# Patient Record
Sex: MALE | Race: BLACK OR AFRICAN AMERICAN | Employment: FULL TIME | URBAN - METROPOLITAN AREA
[De-identification: names, ages, dates, MRNs, and addresses within clinical notes are randomized per-mention and may not be internally consistent; named-entity substitution may affect disease eponyms.]

---

## 2020-10-30 ENCOUNTER — OFFICE VISIT (OUTPATIENT)
Dept: FAMILY MEDICINE CLINIC | Facility: CLINIC | Age: 45
End: 2020-10-30
Payer: COMMERCIAL

## 2020-10-30 VITALS
OXYGEN SATURATION: 98 % | BODY MASS INDEX: 35.52 KG/M2 | TEMPERATURE: 98 F | HEART RATE: 104 BPM | HEIGHT: 73 IN | WEIGHT: 268 LBS | RESPIRATION RATE: 14 BRPM

## 2020-10-30 DIAGNOSIS — Z20.822 SUSPECTED 2019 NOVEL CORONAVIRUS INFECTION: ICD-10-CM

## 2020-10-30 DIAGNOSIS — Z20.822 EXPOSURE TO 2019 NOVEL CORONAVIRUS: Primary | ICD-10-CM

## 2020-10-30 DIAGNOSIS — R05.9 COUGH: ICD-10-CM

## 2020-10-30 PROCEDURE — 99202 OFFICE O/P NEW SF 15 MIN: CPT | Performed by: NURSE PRACTITIONER

## 2020-10-30 PROCEDURE — 3008F BODY MASS INDEX DOCD: CPT | Performed by: NURSE PRACTITIONER

## 2020-10-30 RX ORDER — LISINOPRIL 10 MG/1
10 TABLET ORAL DAILY
COMMUNITY
Start: 2020-04-01

## 2020-10-30 NOTE — PATIENT INSTRUCTIONS
Coronavirus Disease 2019 (COVID-19)     [facility] is committed to the safety and well-being of our patients, members, employees, and communities.  As concerns arise about the new strain of coronavirus that causes COVID-19, [facility] 4. If you have a medical appointment, call the healthcare provider ahead of time and tell them that you have or may have COVID-19.  5. For medical emergencies, call 911 and notify the dispatch personnel that you have or may have COVID-19.   6. Cover your c · At least 10 days have passed since symptoms first appeared OR if asymptomatic patient or date of symptom onset is unclear then use 10 days post COVID test date.    · At least 20 days have passed for severe illness (requiring hospitalization) OR if you are *Some people will be required to have a repeat COVID-19 test in order to be eligible to donate. If you’re instructed by Roque Hernandez that a repeat test is required, please contact the [facility] COVID-19 Nurse Triage Line at [phone].     Additional Inf

## 2020-10-30 NOTE — PROGRESS NOTES
CHIEF COMPLAINT:   Patient presents with:  Covid: exposure      HPI:   Amy Martinez is a 39year old male who presents for possible Covid 19 exposure. Lives with brother in law who has covid symptoms and got tested today.   Reports mild intermittent coug -Quarantine 14 days from last significant exposure, doesn't change based on negative results     To f/u with PCP as needed, to ED if sx worsen.          Patient Instructions   Coronavirus Disease 2019 (COVID-19)     [facility] is committed to th 1. Stay home from work, school, and away from other public places. If you must go out, avoid using any kind of public transportation, ridesharing, or taxis. 2. Monitor your symptoms carefully.  If your symptoms get worse, call your healthcare provider imm If you have tested positive for COVID-19, you should remain under home isolation precautions following the below guidelines:  ? At least 24 hours have passed since recovery defined as resolution of fever without the use of fever-reducing medications; and If you would be interested in donating your plasma to help treat others diagnosed with the virus, please contact [facility] directly on their website: [email or web address]    Who is eligible to donate convalescent plasma?

## 2023-07-17 ENCOUNTER — HOSPITAL ENCOUNTER (EMERGENCY)
Facility: HOSPITAL | Age: 48
Discharge: HOME | End: 2023-07-17
Attending: EMERGENCY MEDICINE
Payer: MEDICAID

## 2023-07-17 VITALS
DIASTOLIC BLOOD PRESSURE: 82 MMHG | TEMPERATURE: 98 F | HEART RATE: 100 BPM | RESPIRATION RATE: 18 BRPM | SYSTOLIC BLOOD PRESSURE: 146 MMHG | OXYGEN SATURATION: 98 %

## 2023-07-17 DIAGNOSIS — S61.217A LACERATION OF LEFT LITTLE FINGER WITHOUT FOREIGN BODY WITHOUT DAMAGE TO NAIL, INITIAL ENCOUNTER: Primary | ICD-10-CM

## 2023-07-17 PROCEDURE — 99283 EMERGENCY DEPT VISIT LOW MDM: CPT

## 2023-07-17 PROCEDURE — 12001 RPR S/N/AX/GEN/TRNK 2.5CM/<: CPT

## 2023-07-27 ENCOUNTER — HOSPITAL ENCOUNTER (EMERGENCY)
Facility: HOSPITAL | Age: 48
Discharge: HOME | End: 2023-07-27
Payer: MEDICAID

## 2023-07-27 VITALS
WEIGHT: 270 LBS | DIASTOLIC BLOOD PRESSURE: 80 MMHG | BODY MASS INDEX: 35.78 KG/M2 | RESPIRATION RATE: 18 BRPM | HEART RATE: 82 BPM | SYSTOLIC BLOOD PRESSURE: 134 MMHG | OXYGEN SATURATION: 100 % | HEIGHT: 73 IN | TEMPERATURE: 98 F

## 2023-07-27 DIAGNOSIS — Z48.02 ENCOUNTER FOR REMOVAL OF SUTURES: Primary | ICD-10-CM

## 2023-07-27 NOTE — ED INITIAL ASSESSMENT (HPI)
Patient arrives ambulatory through triage with request for suture removal.  Sutures placed at [facility] on 7/17.

## 2024-01-16 ENCOUNTER — LAB ENCOUNTER (OUTPATIENT)
Dept: LAB | Age: 49
End: 2024-01-16
Attending: INTERNAL MEDICINE
Payer: COMMERCIAL

## 2024-01-16 DIAGNOSIS — Z00.00 ROUTINE GENERAL MEDICAL EXAMINATION AT A HEALTH CARE FACILITY: ICD-10-CM

## 2024-01-16 DIAGNOSIS — E66.09 EXOGENOUS OBESITY: ICD-10-CM

## 2024-01-16 DIAGNOSIS — E11.65 INADEQUATELY CONTROLLED DIABETES MELLITUS (HCC): Primary | ICD-10-CM

## 2024-01-16 DIAGNOSIS — Z13.228 SCREENING FOR PHENYLKETONURIA (PKU): ICD-10-CM

## 2024-01-16 LAB
BILIRUB UR QL STRIP.AUTO: NEGATIVE
CLARITY UR REFRACT.AUTO: CLEAR
COLOR UR AUTO: COLORLESS
CREAT UR-SCNC: 46.3 MG/DL
GLUCOSE UR STRIP.AUTO-MCNC: >1000 MG/DL
KETONES UR STRIP.AUTO-MCNC: 10 MG/DL
LEUKOCYTE ESTERASE UR QL STRIP.AUTO: NEGATIVE
MICROALBUMIN UR-MCNC: <0.5 MG/DL
NITRITE UR QL STRIP.AUTO: NEGATIVE
PH UR STRIP.AUTO: 5 [PH] (ref 5–8)
PROT UR STRIP.AUTO-MCNC: NEGATIVE MG/DL
RBC UR QL AUTO: NEGATIVE
SP GR UR STRIP.AUTO: 1.03 (ref 1–1.03)
UROBILINOGEN UR STRIP.AUTO-MCNC: NORMAL MG/DL

## 2024-01-16 PROCEDURE — 82043 UR ALBUMIN QUANTITATIVE: CPT

## 2024-01-16 PROCEDURE — 36415 COLL VENOUS BLD VENIPUNCTURE: CPT

## 2024-01-16 PROCEDURE — 84439 ASSAY OF FREE THYROXINE: CPT

## 2024-01-16 PROCEDURE — 83036 HEMOGLOBIN GLYCOSYLATED A1C: CPT

## 2024-01-16 PROCEDURE — 80053 COMPREHEN METABOLIC PANEL: CPT

## 2024-01-16 PROCEDURE — 81003 URINALYSIS AUTO W/O SCOPE: CPT

## 2024-01-16 PROCEDURE — 85025 COMPLETE CBC W/AUTO DIFF WBC: CPT

## 2024-01-16 PROCEDURE — 80061 LIPID PANEL: CPT

## 2024-01-16 PROCEDURE — 84443 ASSAY THYROID STIM HORMONE: CPT

## 2024-01-16 PROCEDURE — 82570 ASSAY OF URINE CREATININE: CPT

## 2024-01-17 DIAGNOSIS — E11.65 INADEQUATELY CONTROLLED DIABETES MELLITUS (HCC): Primary | ICD-10-CM

## 2024-01-17 DIAGNOSIS — Z00.00 ROUTINE GENERAL MEDICAL EXAMINATION AT A HEALTH CARE FACILITY: ICD-10-CM

## 2024-01-17 DIAGNOSIS — E66.09 EXOGENOUS OBESITY: ICD-10-CM

## 2024-01-17 LAB
FASTING PATIENT LIPID ANSWER: YES
FASTING STATUS PATIENT QL REPORTED: YES

## 2024-01-19 ENCOUNTER — LAB ENCOUNTER (OUTPATIENT)
Dept: LAB | Facility: HOSPITAL | Age: 49
End: 2024-01-19
Attending: INTERNAL MEDICINE
Payer: COMMERCIAL

## 2024-01-19 DIAGNOSIS — E11.65 INADEQUATELY CONTROLLED DIABETES MELLITUS (HCC): ICD-10-CM

## 2024-01-19 DIAGNOSIS — Z12.5 SCREENING PSA (PROSTATE SPECIFIC ANTIGEN): Primary | ICD-10-CM

## 2024-01-19 DIAGNOSIS — Z00.00 ROUTINE GENERAL MEDICAL EXAMINATION AT A HEALTH CARE FACILITY: ICD-10-CM

## 2024-01-19 DIAGNOSIS — E66.09 EXOGENOUS OBESITY: ICD-10-CM

## 2024-01-19 LAB
ALBUMIN SERPL-MCNC: 4.9 G/DL (ref 3.2–4.8)
ALBUMIN/GLOB SERPL: 1.5 {RATIO} (ref 1–2)
ALP LIVER SERPL-CCNC: 49 U/L
ALT SERPL-CCNC: 45 U/L
ANION GAP SERPL CALC-SCNC: 12 MMOL/L (ref 0–18)
AST SERPL-CCNC: 33 U/L (ref ?–34)
BASOPHILS # BLD AUTO: 0.06 X10(3) UL (ref 0–0.2)
BASOPHILS NFR BLD AUTO: 0.8 %
BILIRUB SERPL-MCNC: 1.4 MG/DL (ref 0.3–1.2)
BUN BLD-MCNC: 24 MG/DL (ref 9–23)
BUN/CREAT SERPL: 14.7 (ref 10–20)
CALCIUM BLD-MCNC: 10.3 MG/DL (ref 8.7–10.4)
CHLORIDE SERPL-SCNC: 99 MMOL/L (ref 98–112)
CHOLEST SERPL-MCNC: 150 MG/DL (ref ?–200)
CO2 SERPL-SCNC: 24 MMOL/L (ref 21–32)
COMPLEXED PSA SERPL-MCNC: 0.38 NG/ML (ref ?–4)
CREAT BLD-MCNC: 1.63 MG/DL
DEPRECATED RDW RBC AUTO: 40.7 FL (ref 35.1–46.3)
EGFRCR SERPLBLD CKD-EPI 2021: 52 ML/MIN/1.73M2 (ref 60–?)
EOSINOPHIL # BLD AUTO: 0.1 X10(3) UL (ref 0–0.7)
EOSINOPHIL NFR BLD AUTO: 1.3 %
ERYTHROCYTE [DISTWIDTH] IN BLOOD BY AUTOMATED COUNT: 12.2 % (ref 11–15)
EST. AVERAGE GLUCOSE BLD GHB EST-MCNC: 392 MG/DL (ref 68–126)
GLOBULIN PLAS-MCNC: 3.2 G/DL (ref 2.8–4.4)
GLUCOSE BLD-MCNC: 311 MG/DL (ref 70–99)
HBA1C MFR BLD: 15.3 % (ref ?–5.7)
HCT VFR BLD AUTO: 46.4 %
HDLC SERPL-MCNC: 35 MG/DL (ref 40–59)
HGB BLD-MCNC: 15.4 G/DL
IMM GRANULOCYTES # BLD AUTO: 0.03 X10(3) UL (ref 0–1)
IMM GRANULOCYTES NFR BLD: 0.4 %
LDLC SERPL CALC-MCNC: 84 MG/DL (ref ?–100)
LYMPHOCYTES # BLD AUTO: 3.11 X10(3) UL (ref 1–4)
LYMPHOCYTES NFR BLD AUTO: 39 %
MCH RBC QN AUTO: 30.1 PG (ref 26–34)
MCHC RBC AUTO-ENTMCNC: 33.2 G/DL (ref 31–37)
MCV RBC AUTO: 90.6 FL
MONOCYTES # BLD AUTO: 0.76 X10(3) UL (ref 0.1–1)
MONOCYTES NFR BLD AUTO: 9.5 %
NEUTROPHILS # BLD AUTO: 3.91 X10 (3) UL (ref 1.5–7.7)
NEUTROPHILS # BLD AUTO: 3.91 X10(3) UL (ref 1.5–7.7)
NEUTROPHILS NFR BLD AUTO: 49 %
NONHDLC SERPL-MCNC: 115 MG/DL (ref ?–130)
OSMOLALITY SERPL CALC.SUM OF ELEC: 296 MOSM/KG (ref 275–295)
PLATELET # BLD AUTO: 223 10(3)UL (ref 150–450)
POTASSIUM SERPL-SCNC: 4.6 MMOL/L (ref 3.5–5.1)
PROT SERPL-MCNC: 8.1 G/DL (ref 5.7–8.2)
RBC # BLD AUTO: 5.12 X10(6)UL
SODIUM SERPL-SCNC: 135 MMOL/L (ref 136–145)
T4 FREE SERPL-MCNC: 1.5 NG/DL (ref 0.8–1.7)
TRIGL SERPL-MCNC: 182 MG/DL (ref 30–149)
TSI SER-ACNC: 1.88 MIU/ML (ref 0.55–4.78)
VLDLC SERPL CALC-MCNC: 29 MG/DL (ref 0–30)
WBC # BLD AUTO: 8 X10(3) UL (ref 4–11)

## 2024-01-19 PROCEDURE — 36415 COLL VENOUS BLD VENIPUNCTURE: CPT

## 2024-01-19 PROCEDURE — 84443 ASSAY THYROID STIM HORMONE: CPT

## 2024-01-19 PROCEDURE — 80053 COMPREHEN METABOLIC PANEL: CPT

## 2024-01-19 PROCEDURE — 83036 HEMOGLOBIN GLYCOSYLATED A1C: CPT

## 2024-01-19 PROCEDURE — 80061 LIPID PANEL: CPT

## 2024-01-19 PROCEDURE — 84439 ASSAY OF FREE THYROXINE: CPT

## 2024-01-19 PROCEDURE — 85025 COMPLETE CBC W/AUTO DIFF WBC: CPT

## 2024-01-22 ENCOUNTER — LAB ENCOUNTER (OUTPATIENT)
Dept: LAB | Facility: HOSPITAL | Age: 49
End: 2024-01-22
Attending: INTERNAL MEDICINE
Payer: COMMERCIAL

## 2024-01-22 DIAGNOSIS — E11.65 INADEQUATELY CONTROLLED DIABETES MELLITUS (HCC): ICD-10-CM

## 2024-01-22 DIAGNOSIS — Z13.228 SCREENING FOR PHENYLKETONURIA (PKU): ICD-10-CM

## 2024-01-22 DIAGNOSIS — E66.09 EXOGENOUS OBESITY: ICD-10-CM

## 2024-01-22 DIAGNOSIS — Z00.00 ROUTINE GENERAL MEDICAL EXAMINATION AT A HEALTH CARE FACILITY: Primary | ICD-10-CM

## 2024-01-22 LAB
ALBUMIN SERPL-MCNC: 4.9 G/DL (ref 3.2–4.8)
ALBUMIN/GLOB SERPL: 1.7 {RATIO} (ref 1–2)
ALP LIVER SERPL-CCNC: 54 U/L
ALT SERPL-CCNC: 50 U/L
ANION GAP SERPL CALC-SCNC: 8 MMOL/L (ref 0–18)
AST SERPL-CCNC: 31 U/L (ref ?–34)
BILIRUB SERPL-MCNC: 0.9 MG/DL (ref 0.3–1.2)
BUN BLD-MCNC: 19 MG/DL (ref 9–23)
BUN/CREAT SERPL: 13.3 (ref 10–20)
CALCIUM BLD-MCNC: 10.1 MG/DL (ref 8.7–10.4)
CHLORIDE SERPL-SCNC: 100 MMOL/L (ref 98–112)
CO2 SERPL-SCNC: 27 MMOL/L (ref 21–32)
CREAT BLD-MCNC: 1.43 MG/DL
EGFRCR SERPLBLD CKD-EPI 2021: 60 ML/MIN/1.73M2 (ref 60–?)
FASTING STATUS PATIENT QL REPORTED: YES
GLOBULIN PLAS-MCNC: 2.9 G/DL (ref 2.8–4.4)
GLUCOSE BLD-MCNC: 313 MG/DL (ref 70–99)
OSMOLALITY SERPL CALC.SUM OF ELEC: 294 MOSM/KG (ref 275–295)
POTASSIUM SERPL-SCNC: 4.7 MMOL/L (ref 3.5–5.1)
PROT SERPL-MCNC: 7.8 G/DL (ref 5.7–8.2)
SODIUM SERPL-SCNC: 135 MMOL/L (ref 136–145)

## 2024-01-22 PROCEDURE — 36415 COLL VENOUS BLD VENIPUNCTURE: CPT

## 2024-01-22 PROCEDURE — 80053 COMPREHEN METABOLIC PANEL: CPT

## 2024-02-01 ENCOUNTER — OFFICE VISIT (OUTPATIENT)
Dept: INTERNAL MEDICINE CLINIC | Facility: CLINIC | Age: 49
End: 2024-02-01

## 2024-02-01 VITALS
SYSTOLIC BLOOD PRESSURE: 102 MMHG | HEART RATE: 74 BPM | BODY MASS INDEX: 33.4 KG/M2 | DIASTOLIC BLOOD PRESSURE: 60 MMHG | RESPIRATION RATE: 17 BRPM | TEMPERATURE: 98 F | OXYGEN SATURATION: 98 % | HEIGHT: 73 IN | WEIGHT: 252 LBS

## 2024-02-01 DIAGNOSIS — N18.30 TYPE 2 DIABETES MELLITUS WITH STAGE 3 CHRONIC KIDNEY DISEASE, WITHOUT LONG-TERM CURRENT USE OF INSULIN, UNSPECIFIED WHETHER STAGE 3A OR 3B CKD (HCC): Primary | ICD-10-CM

## 2024-02-01 DIAGNOSIS — E78.2 MIXED HYPERLIPIDEMIA: ICD-10-CM

## 2024-02-01 DIAGNOSIS — E11.22 TYPE 2 DIABETES MELLITUS WITH STAGE 3 CHRONIC KIDNEY DISEASE, WITHOUT LONG-TERM CURRENT USE OF INSULIN, UNSPECIFIED WHETHER STAGE 3A OR 3B CKD (HCC): Primary | ICD-10-CM

## 2024-02-01 DIAGNOSIS — N18.30 STAGE 3 CHRONIC KIDNEY DISEASE, UNSPECIFIED WHETHER STAGE 3A OR 3B CKD (HCC): ICD-10-CM

## 2024-02-01 DIAGNOSIS — I10 PRIMARY HYPERTENSION: ICD-10-CM

## 2024-02-01 DIAGNOSIS — I25.10 MILD CAD: ICD-10-CM

## 2024-02-01 PROCEDURE — 90686 IIV4 VACC NO PRSV 0.5 ML IM: CPT | Performed by: INTERNAL MEDICINE

## 2024-02-01 PROCEDURE — 90471 IMMUNIZATION ADMIN: CPT | Performed by: INTERNAL MEDICINE

## 2024-02-01 PROCEDURE — 99204 OFFICE O/P NEW MOD 45 MIN: CPT | Performed by: INTERNAL MEDICINE

## 2024-02-01 RX ORDER — METFORMIN HYDROCHLORIDE 500 MG/1
1000 TABLET, EXTENDED RELEASE ORAL
COMMUNITY
Start: 2023-11-01

## 2024-02-01 RX ORDER — HYDROCHLOROTHIAZIDE 25 MG/1
25 TABLET ORAL EVERY MORNING
COMMUNITY
Start: 2023-01-09

## 2024-02-01 RX ORDER — AMOXICILLIN 500 MG/1
500 CAPSULE ORAL 2 TIMES DAILY
COMMUNITY
Start: 2024-01-31

## 2024-02-01 RX ORDER — ATORVASTATIN CALCIUM 10 MG/1
10 TABLET, FILM COATED ORAL NIGHTLY
COMMUNITY
Start: 2023-10-02

## 2024-02-01 NOTE — PROGRESS NOTES
Subjective:     Patient ID: Pamela Bagley is a 48 year old male.    HPI    Patient comes in today first time to establish care was following at [facility] before he just had an annual physical done with another physician but he does not want to follow-up there again patient asking about his leasant lab results he says he was never really given the result only was told to take this medication.  Patient says he had ran out of Januvia recently was only taking metformin for his diabetes his A1c came back at 15.  Patient says he is trying to watch diet was started on Farxiga 5 mg also his sugars have improved but they are still in the 200s.  He does drink plenty of fluid no chest pain or shortness of breath recently had cardiac cath last year with mild coronary artery disease.  Renal function slightly off also patient says he does drink a lot of fluids every day he had colonoscopy last year    History/Other:   Review of Systems   Constitutional: Negative.    HENT: Negative.     Eyes: Negative.    Respiratory: Negative.     Cardiovascular: Negative.    Gastrointestinal: Negative.    Genitourinary: Negative.    Musculoskeletal: Negative.    Skin: Negative.    Neurological: Negative.    Psychiatric/Behavioral: Negative.       Current Outpatient Medications   Medication Sig Dispense Refill    hydroCHLOROthiazide 25 MG Oral Tab Take 1 tablet (25 mg total) by mouth every morning.      metFORMIN  MG Oral Tablet 24 Hr Take 2 tablets (1,000 mg total) by mouth daily with breakfast.      atorvastatin 10 MG Oral Tab Take 1 tablet (10 mg total) by mouth nightly.      amoxicillin 500 MG Oral Cap Take 1 capsule (500 mg total) by mouth 2 (two) times daily.      dapagliflozin 10 MG Oral Tab Take 1 tablet (10 mg total) by mouth daily. 30 tablet 2    lisinopril 10 MG Oral Tab Take 1 tablet (10 mg total) by mouth daily.       Allergies:No Known Allergies    Past Medical History:   Diagnosis Date    Diabetes (HCC)     Essential  hypertension       No past surgical history on file.   No family history on file.   Social History:   Social History     Socioeconomic History    Marital status:    Tobacco Use    Smoking status: Never    Smokeless tobacco: Never   Substance and Sexual Activity    Alcohol use: Not Currently    Drug use: Not Currently        Objective:   Physical Exam  Vitals and nursing note reviewed.   Constitutional:       Appearance: He is well-developed.   HENT:      Head: Normocephalic and atraumatic.      Right Ear: External ear normal.      Left Ear: External ear normal.      Nose: Nose normal.   Eyes:      Conjunctiva/sclera: Conjunctivae normal.      Pupils: Pupils are equal, round, and reactive to light.   Cardiovascular:      Rate and Rhythm: Normal rate and regular rhythm.      Heart sounds: Normal heart sounds.   Pulmonary:      Effort: Pulmonary effort is normal.      Breath sounds: Normal breath sounds.   Abdominal:      General: Bowel sounds are normal.      Palpations: Abdomen is soft.   Genitourinary:     Penis: Normal.       Prostate: Normal.      Rectum: Normal.   Musculoskeletal:         General: Normal range of motion.      Cervical back: Normal range of motion and neck supple.   Skin:     General: Skin is warm and dry.   Neurological:      Mental Status: He is alert and oriented to person, place, and time.      Deep Tendon Reflexes: Reflexes are normal and symmetric.         Assessment & Plan:   1. Type 2 diabetes mellitus with stage 3 chronic kidney disease, without long-term current use of insulin, unspecified whether stage 3a or 3b CKD (HCC) we will increase the Farxiga to 10 mg will refer to diabetic educator and endocrine   2. Primary hypertension well-controlled continue current treatment   3. Mixed hyperlipidemia continue current treatment   4. Mild CAD follow-up with cardiology medical management   5. Stage 3 chronic kidney disease, unspecified whether stage 3a or 3b CKD (HCC) follows with  nephrology will give referral       No orders of the defined types were placed in this encounter.      Meds This Visit:  Requested Prescriptions     Signed Prescriptions Disp Refills    dapagliflozin 10 MG Oral Tab 30 tablet 2     Sig: Take 1 tablet (10 mg total) by mouth daily.       Imaging & Referrals:  NEPHROLOGY - INTERNAL  OPHTHALMOLOGY - INTERNAL  ENDOCRINOLOGY - INTERNAL  DIABETIC EDUCATION - INTERNAL  CARDIO - INTERNAL

## 2024-02-02 ENCOUNTER — LAB ENCOUNTER (OUTPATIENT)
Dept: LAB | Facility: HOSPITAL | Age: 49
End: 2024-02-02
Attending: INTERNAL MEDICINE
Payer: COMMERCIAL

## 2024-02-02 ENCOUNTER — OFFICE VISIT (OUTPATIENT)
Dept: NEPHROLOGY | Facility: CLINIC | Age: 49
End: 2024-02-02

## 2024-02-02 ENCOUNTER — TELEPHONE (OUTPATIENT)
Dept: INTERNAL MEDICINE CLINIC | Facility: CLINIC | Age: 49
End: 2024-02-02

## 2024-02-02 VITALS
HEART RATE: 93 BPM | HEIGHT: 73 IN | SYSTOLIC BLOOD PRESSURE: 98 MMHG | WEIGHT: 251 LBS | DIASTOLIC BLOOD PRESSURE: 64 MMHG | BODY MASS INDEX: 33.27 KG/M2

## 2024-02-02 DIAGNOSIS — N18.31 STAGE 3A CHRONIC KIDNEY DISEASE (HCC): ICD-10-CM

## 2024-02-02 DIAGNOSIS — E11.22 TYPE 2 DIABETES MELLITUS WITH STAGE 3 CHRONIC KIDNEY DISEASE, WITHOUT LONG-TERM CURRENT USE OF INSULIN, UNSPECIFIED WHETHER STAGE 3A OR 3B CKD (HCC): Primary | ICD-10-CM

## 2024-02-02 DIAGNOSIS — N18.31 STAGE 3A CHRONIC KIDNEY DISEASE (HCC): Primary | ICD-10-CM

## 2024-02-02 DIAGNOSIS — N18.30 TYPE 2 DIABETES MELLITUS WITH STAGE 3 CHRONIC KIDNEY DISEASE, WITHOUT LONG-TERM CURRENT USE OF INSULIN, UNSPECIFIED WHETHER STAGE 3A OR 3B CKD (HCC): Primary | ICD-10-CM

## 2024-02-02 LAB
ALBUMIN SERPL-MCNC: 4.8 G/DL (ref 3.2–4.8)
ANION GAP SERPL CALC-SCNC: 9 MMOL/L (ref 0–18)
BASOPHILS # BLD AUTO: 0.06 X10(3) UL (ref 0–0.2)
BASOPHILS NFR BLD AUTO: 0.9 %
BILIRUB UR QL: NEGATIVE
BUN BLD-MCNC: 19 MG/DL (ref 9–23)
BUN/CREAT SERPL: 13.7 (ref 10–20)
C3 SERPL-MCNC: 148.7 MG/DL (ref 90–170)
C4 SERPL-MCNC: 30 MG/DL (ref 12–36)
CALCIUM BLD-MCNC: 9.6 MG/DL (ref 8.7–10.4)
CHLORIDE SERPL-SCNC: 99 MMOL/L (ref 98–112)
CLARITY UR: CLEAR
CO2 SERPL-SCNC: 26 MMOL/L (ref 21–32)
COLOR UR: COLORLESS
CREAT BLD-MCNC: 1.39 MG/DL
CREAT UR-SCNC: 82 MG/DL
CRP SERPL-MCNC: <0.4 MG/DL (ref ?–1)
DEPRECATED RDW RBC AUTO: 40 FL (ref 35.1–46.3)
EGFRCR SERPLBLD CKD-EPI 2021: 63 ML/MIN/1.73M2 (ref 60–?)
EOSINOPHIL # BLD AUTO: 0.13 X10(3) UL (ref 0–0.7)
EOSINOPHIL NFR BLD AUTO: 1.8 %
ERYTHROCYTE [DISTWIDTH] IN BLOOD BY AUTOMATED COUNT: 12.3 % (ref 11–15)
ERYTHROCYTE [SEDIMENTATION RATE] IN BLOOD: 11 MM/HR
GLUCOSE BLD-MCNC: 230 MG/DL (ref 70–99)
GLUCOSE UR-MCNC: >1000 MG/DL
HCT VFR BLD AUTO: 44.1 %
HGB BLD-MCNC: 15.1 G/DL
HGB UR QL STRIP.AUTO: NEGATIVE
IMM GRANULOCYTES # BLD AUTO: 0.02 X10(3) UL (ref 0–1)
IMM GRANULOCYTES NFR BLD: 0.3 %
LEUKOCYTE ESTERASE UR QL STRIP.AUTO: NEGATIVE
LYMPHOCYTES # BLD AUTO: 2.87 X10(3) UL (ref 1–4)
LYMPHOCYTES NFR BLD AUTO: 40.8 %
MCH RBC QN AUTO: 30.3 PG (ref 26–34)
MCHC RBC AUTO-ENTMCNC: 34.2 G/DL (ref 31–37)
MCV RBC AUTO: 88.6 FL
MICROALBUMIN UR-MCNC: <0.3 MG/DL
MONOCYTES # BLD AUTO: 0.69 X10(3) UL (ref 0.1–1)
MONOCYTES NFR BLD AUTO: 9.8 %
NEUTROPHILS # BLD AUTO: 3.27 X10 (3) UL (ref 1.5–7.7)
NEUTROPHILS # BLD AUTO: 3.27 X10(3) UL (ref 1.5–7.7)
NEUTROPHILS NFR BLD AUTO: 46.4 %
NITRITE UR QL STRIP.AUTO: NEGATIVE
OSMOLALITY SERPL CALC.SUM OF ELEC: 288 MOSM/KG (ref 275–295)
PH UR: 5 [PH] (ref 5–8)
PHOSPHATE SERPL-MCNC: 3.8 MG/DL (ref 2.4–5.1)
PLATELET # BLD AUTO: 239 10(3)UL (ref 150–450)
POTASSIUM SERPL-SCNC: 4.1 MMOL/L (ref 3.5–5.1)
PROT UR-MCNC: 6.2 MG/DL (ref ?–14)
PROT UR-MCNC: NEGATIVE MG/DL
RBC # BLD AUTO: 4.98 X10(6)UL
RHEUMATOID FACT SERPL-ACNC: <10 IU/ML (ref ?–14)
SODIUM SERPL-SCNC: 134 MMOL/L (ref 136–145)
SP GR UR STRIP: 1.03 (ref 1–1.03)
UROBILINOGEN UR STRIP-ACNC: NORMAL
WBC # BLD AUTO: 7 X10(3) UL (ref 4–11)

## 2024-02-02 PROCEDURE — 86334 IMMUNOFIX E-PHORESIS SERUM: CPT

## 2024-02-02 PROCEDURE — 82043 UR ALBUMIN QUANTITATIVE: CPT

## 2024-02-02 PROCEDURE — 85652 RBC SED RATE AUTOMATED: CPT

## 2024-02-02 PROCEDURE — 85025 COMPLETE CBC W/AUTO DIFF WBC: CPT

## 2024-02-02 PROCEDURE — 86431 RHEUMATOID FACTOR QUANT: CPT

## 2024-02-02 PROCEDURE — 81003 URINALYSIS AUTO W/O SCOPE: CPT

## 2024-02-02 PROCEDURE — 86140 C-REACTIVE PROTEIN: CPT

## 2024-02-02 PROCEDURE — 82784 ASSAY IGA/IGD/IGG/IGM EACH: CPT

## 2024-02-02 PROCEDURE — 99205 OFFICE O/P NEW HI 60 MIN: CPT | Performed by: INTERNAL MEDICINE

## 2024-02-02 PROCEDURE — 80069 RENAL FUNCTION PANEL: CPT

## 2024-02-02 PROCEDURE — 84156 ASSAY OF PROTEIN URINE: CPT

## 2024-02-02 PROCEDURE — 86038 ANTINUCLEAR ANTIBODIES: CPT

## 2024-02-02 PROCEDURE — 86335 IMMUNFIX E-PHORSIS/URINE/CSF: CPT

## 2024-02-02 PROCEDURE — 86160 COMPLEMENT ANTIGEN: CPT

## 2024-02-02 PROCEDURE — 36415 COLL VENOUS BLD VENIPUNCTURE: CPT

## 2024-02-02 PROCEDURE — 82570 ASSAY OF URINE CREATININE: CPT

## 2024-02-02 PROCEDURE — 84165 PROTEIN E-PHORESIS SERUM: CPT

## 2024-02-02 PROCEDURE — 84166 PROTEIN E-PHORESIS/URINE/CSF: CPT

## 2024-02-02 NOTE — PATIENT INSTRUCTIONS
Please do labs and kidney ultrasound as ordered.  We will contact you when results are in.  Keep working on getting your A1c under control.

## 2024-02-02 NOTE — PROGRESS NOTES
02/02/24        Patient: Pamela Bagley   YOB: 1975   Date of Visit: 2/2/2024       Dear  Dr. Nick MD,      Thank you for referring Pamela Bagley to my practice.  Please find my assessment and plan below.      As you know he is a 48-year-old male with a history of hypertension and adult onset diabetes mellitus who I now had the pleasure of seeing for evaluation of what may be chronic kidney disease stage III.  Laboratory studies have been reviewed in [facility] and in care everywhere.  Of note is he had a creatinine of 1.4 back in February 2023.  Was 1.20 in September 2023 but in October 2023 his creatinine crept up to 1.6.  Now more recently in January 19, 2000 2040 creatinine 1.63 with an estimated GFR of 52 cc/min.  On that date though his blood sugar was 311 with an A1c of 15.3.  Repeat labs done on January 22, 2024 shows creatinine was a bit better at 1.43.    His past medical history is significant for hypertension diagnosed in 2015.  States he was diagnosed with adult onset diabetes mellitus in 2017.  He was started on medications but subsequently was able to control his diabetes with diet alone.  Did well for 2 or 3 years but then his blood sugars increase and diabetic medications were resumed in 2021.  One of his medications was Januvia.  He had insurance issues and as result cannot afford that medication.  Now has new insurance starting at the beginning of the year and wants to get back on track.  Medications are as listed.  Denies any significant use of nonsteroidals.    Social history quit smoking cigarettes 13 years ago.  Family history is notable for father who apparently had liver failure and subsequent developed chronic kidney disease and recently underwent a liver kidney transplantation.  Doing well.  Family history is also positive for diabetes.  Review of system patient was states he is doing well without any chest pain, shortness of breath, or GI symptoms.  He has noted urinary  frequency with nocturia several times which now is improving as his blood sugars have improved.  10 point review of systems is otherwise unremarkable.    Physical exam initial blood pressure seems a bit low at 98/64 but repeat was 120/75.  Pulse was 93 and he weighed 251 pounds.  His neck was supple without JVD.  Lungs are clear.  Heart revealed a regular rate and rhythm and S4 but no gallops, murmurs or rubs.  Abdomen was soft, flat, nontender without organomegaly, masses or bruits.  Extremities revealed no clubbing, cyanosis or edema.    I therefore informed the patient that he may have chronic kidney disease III secondary to diabetes and/or hypertension.  Some of this may be acute secondary to diabetes out-of-control.  Hopefully will see some improvement in his renal function.  For completion sake a repeat CBC, renal panel, urinalysis, urine for microalbumin, urine for Bence-Astorga protein, sed rate, connective tissue profile and a renal ultrasound have been ordered.  Further impressions and recommendations will be forthcoming after reviewing the above.  He will check his blood pressures daily to confirm that his blood pressures are under adequate control.  Reinforced the importance of getting his A1c down below 7 in order to help prevent diabetic complications.  The patient states he understands and is getting back to exercise and watching his diet.    Thank you very much for allowing me to participate in the care of your patient.  If you have any questions please feel free to call.           Sincerely,   Donato Melendez MD   [facility], [facility], [place]  [address]  [facility] [place] [address]    Document electronically generated by:  Donato Melendez MD

## 2024-02-02 NOTE — TELEPHONE ENCOUNTER
Patient called, verified Name and . States he received Ophthalmology referral from PCP yesterday, however, upon scheduling an appointment, he was told that Dr. Peguero is a pediatric ophthalmologist. He was given an appointment with a different provider but appointment is not until August. He wants to be seen sooner than August. He is requesting for a new Ophthalmology referral.      Confirmed with Ophthalmology, earliest availability with Dr. Cao is on . States that they usually recommend patient to see [facility] for sooner availability, either Dr. Campos or Dr. Parkinson.    Dr. Aureliano Romero please see pended referral for review and approval if appropriate.

## 2024-02-05 ENCOUNTER — TELEPHONE (OUTPATIENT)
Dept: NEPHROLOGY | Facility: CLINIC | Age: 49
End: 2024-02-05

## 2024-02-05 ENCOUNTER — HOSPITAL ENCOUNTER (OUTPATIENT)
Dept: ULTRASOUND IMAGING | Facility: HOSPITAL | Age: 49
Discharge: HOME | End: 2024-02-05
Attending: INTERNAL MEDICINE
Payer: COMMERCIAL

## 2024-02-05 DIAGNOSIS — N18.31 STAGE 3A CHRONIC KIDNEY DISEASE (HCC): ICD-10-CM

## 2024-02-05 LAB — NUCLEAR IGG TITR SER IF: NEGATIVE {TITER}

## 2024-02-05 PROCEDURE — 76770 US EXAM ABDO BACK WALL COMP: CPT | Performed by: INTERNAL MEDICINE

## 2024-02-05 NOTE — TELEPHONE ENCOUNTER
Labs and kidney ultrasound are in.  Please see soon for follow-up to review.  Bring in any recent blood pressure readings

## 2024-02-06 ENCOUNTER — TELEPHONE (OUTPATIENT)
Dept: INTERNAL MEDICINE CLINIC | Facility: CLINIC | Age: 49
End: 2024-02-06

## 2024-02-06 DIAGNOSIS — E11.22 TYPE 2 DIABETES MELLITUS WITH STAGE 3 CHRONIC KIDNEY DISEASE, WITHOUT LONG-TERM CURRENT USE OF INSULIN, UNSPECIFIED WHETHER STAGE 3A OR 3B CKD (HCC): Primary | ICD-10-CM

## 2024-02-06 DIAGNOSIS — N18.30 TYPE 2 DIABETES MELLITUS WITH STAGE 3 CHRONIC KIDNEY DISEASE, WITHOUT LONG-TERM CURRENT USE OF INSULIN, UNSPECIFIED WHETHER STAGE 3A OR 3B CKD (HCC): Primary | ICD-10-CM

## 2024-02-06 RX ORDER — METFORMIN HYDROCHLORIDE 500 MG/1
1000 TABLET, EXTENDED RELEASE ORAL
Qty: 180 TABLET | Refills: 1 | Status: SHIPPED | OUTPATIENT
Start: 2024-02-06

## 2024-02-06 NOTE — TELEPHONE ENCOUNTER
Patient needs a refill; has only a few pills left.    metFORMIN  MG Oral Tablet 24 Hr -- -- 11/1/2023 --   Sig:   Take 2 tablets (1,000 mg total) by mouth daily with breakfast.       [id number] [address]. [phone], [phone]

## 2024-02-06 NOTE — TELEPHONE ENCOUNTER
Patient would like another referral for an ophthalmologist. Dr. Campos is not able to see the patient until August; please call.

## 2024-02-06 NOTE — TELEPHONE ENCOUNTER
Dr. Aureliano Romero:  patient needs refill.   This would be new RX from you.    Patient establish care with Dr Romero 2/1/24.       Requested Prescriptions     Pending Prescriptions Disp Refills    metFORMIN  MG Oral Tablet 24 Hr 180 tablet 1     Sig: Take 2 tablets (1,000 mg total) by mouth daily with breakfast.         Recent Visits  Date Type Provider Dept   02/01/24 Office Visit Aureliano Romero MD [id number]   Showing recent visits within past 540 days with a meds authorizing provider and meeting all other requirements  Future Appointments  No visits were found meeting these conditions.  Showing future appointments within next 150 days with a meds authorizing provider and meeting all other requirements

## 2024-02-07 LAB
ALBUMIN SERPL ELPH-MCNC: 4.77 G/DL (ref 3.75–5.21)
ALBUMIN/GLOB SERPL: 1.69 {RATIO} (ref 1–2)
ALPHA1 GLOB SERPL ELPH-MCNC: 0.24 G/DL (ref 0.19–0.46)
ALPHA2 GLOB SERPL ELPH-MCNC: 0.62 G/DL (ref 0.48–1.05)
B-GLOBULIN SERPL ELPH-MCNC: 0.82 G/DL (ref 0.68–1.23)
GAMMA GLOB SERPL ELPH-MCNC: 1.15 G/DL (ref 0.62–1.7)
IGA SERPL-MCNC: 207.4 MG/DL (ref 40–350)
IGM SERPL-MCNC: 67.7 MG/DL (ref 50–300)
IMMUNOGLOBULIN PNL SER-MCNC: 1235 MG/DL (ref 650–1600)
PROT SERPL-MCNC: 7.6 G/DL (ref 5.7–8.2)

## 2024-02-08 ENCOUNTER — TELEPHONE (OUTPATIENT)
Dept: NEPHROLOGY | Facility: CLINIC | Age: 49
End: 2024-02-08

## 2024-02-08 NOTE — TELEPHONE ENCOUNTER
Labs and kidney ultrasound are in.  please see soon for follow-up to review.  Bring in any recent blood pressure readings.

## 2024-02-15 ENCOUNTER — OFFICE VISIT (OUTPATIENT)
Facility: LOCATION | Age: 49
End: 2024-02-15

## 2024-02-15 VITALS
OXYGEN SATURATION: 98 % | HEART RATE: 89 BPM | WEIGHT: 252 LBS | BODY MASS INDEX: 33.4 KG/M2 | DIASTOLIC BLOOD PRESSURE: 72 MMHG | HEIGHT: 73 IN | SYSTOLIC BLOOD PRESSURE: 132 MMHG

## 2024-02-15 DIAGNOSIS — I10 HYPERTENSION, UNSPECIFIED TYPE: ICD-10-CM

## 2024-02-15 DIAGNOSIS — N18.9 CHRONIC KIDNEY DISEASE, UNSPECIFIED CKD STAGE: ICD-10-CM

## 2024-02-15 DIAGNOSIS — E11.65 UNCONTROLLED TYPE 2 DIABETES MELLITUS WITH HYPERGLYCEMIA (HCC): Primary | ICD-10-CM

## 2024-02-15 DIAGNOSIS — I10 PRIMARY HYPERTENSION: ICD-10-CM

## 2024-02-15 DIAGNOSIS — E11.65 HYPERGLYCEMIA DUE TO DIABETES MELLITUS (HCC): ICD-10-CM

## 2024-02-15 DIAGNOSIS — E66.9 CLASS 1 OBESITY WITH BODY MASS INDEX (BMI) OF 33.0 TO 33.9 IN ADULT, UNSPECIFIED OBESITY TYPE, UNSPECIFIED WHETHER SERIOUS COMORBIDITY PRESENT: ICD-10-CM

## 2024-02-15 DIAGNOSIS — E78.5 DYSLIPIDEMIA: ICD-10-CM

## 2024-02-15 DIAGNOSIS — R73.09 HIGH GLUCOSE LEVEL: ICD-10-CM

## 2024-02-15 LAB
GLUCOSE BLOOD: 328
TEST STRIP EXPIRATION DATE: NORMAL DATE
TEST STRIP LOT #: NORMAL NUMERIC

## 2024-02-15 PROCEDURE — 99205 OFFICE O/P NEW HI 60 MIN: CPT | Performed by: INTERNAL MEDICINE

## 2024-02-15 PROCEDURE — 82947 ASSAY GLUCOSE BLOOD QUANT: CPT | Performed by: INTERNAL MEDICINE

## 2024-02-15 RX ORDER — METFORMIN HYDROCHLORIDE 500 MG/1
1000 TABLET, EXTENDED RELEASE ORAL 2 TIMES DAILY WITH MEALS
Qty: 120 TABLET | Refills: 1 | Status: SHIPPED | OUTPATIENT
Start: 2024-02-15 | End: 2024-08-13

## 2024-02-15 RX ORDER — INSULIN GLARGINE-YFGN 100 [IU]/ML
30 INJECTION, SOLUTION SUBCUTANEOUS DAILY
Qty: 9 ML | Refills: 0 | Status: SHIPPED | OUTPATIENT
Start: 2024-02-15 | End: 2024-03-16

## 2024-02-15 RX ORDER — TIRZEPATIDE 2.5 MG/.5ML
2.5 INJECTION, SOLUTION SUBCUTANEOUS
Qty: 2 EACH | Refills: 3 | Status: SHIPPED | OUTPATIENT
Start: 2024-02-15

## 2024-02-15 RX ORDER — ACYCLOVIR 400 MG/1
1 TABLET ORAL
Qty: 3 EACH | Refills: 5 | Status: SHIPPED | OUTPATIENT
Start: 2024-02-15 | End: 2024-08-13

## 2024-02-15 RX ORDER — INSULIN LISPRO 200 [IU]/ML
10 INJECTION, SOLUTION SUBCUTANEOUS
Qty: 45 EACH | Refills: 0 | Status: SHIPPED | OUTPATIENT
Start: 2024-02-15 | End: 2024-05-15

## 2024-02-15 NOTE — PROGRESS NOTES
New Patient Evaluation - History and Physical    CONSULT - Reason for Visit:  uncontrolled DM.  Requesting Physician:   Opal Romero MD      CHIEF COMPLAINT:    Chief Complaint   Patient presents with    Consult     Diabetes last A1c 15.3        HISTORY OF PRESENT ILLNESS:   Pamela Bagley is a 48 year old male who presents with  uncontrolled DM, CKD, HTN, DLP, obese     t2DM was Dx in 2017   Now on   metformin 1000 mg daily - will increase to bid   Farxiga 10 started 2024    Was on januvia but stopped d/t cost    Was on insulin for short term when was dx  for ~ 9 mo    2/15/2024  - had chips this AM    Has blurry vision now       Lab Results   Component Value Date    A1C 15.3 (H) 2024      DM quality measures:  A1C/Blood pressure: as reported above   Nephropathy screenin2024  continue ace /arb rx.   LIPID screenin2024 .   statin rx.   Last dilated eye exam: No data recorded  10/2023  Exam shows retinopathy? No data recorded  Last diabetic foot exam: No data recorded  Dentist : recommend every 6m  Neuropathy:  no   CAD/ASCVD/PAD/CVA: no     GLP-1 agonists:  No personal or family history of MEN syndrome   No personal history pancreatitis   Patient counselled regarding side effects including injection site reactions, nausea, vomiting, diarrhea, pancreatitis, gastroparesis and rare side effect candis Julian syndrome.    SGLT2 inhibitors  No UTI or yeast infection   Discussed side effects including UTI and fungal infections.   Discussed the importance of hydration.      He is seeing nephrology     Snacks fruits , smoothies,   Dinner is largest meal   No sugary drinks     ASSESSMENT AND PLAN:    48 year old male who presents with  uncontrolled complicated DM, CKD, HTN, DLP, obese   He drinks smoothies and takes  high carb snacks   He will benefit from CDE consult, more teaching and close f/u   Reviewed his labs and explained the need to control BG.   Reviewed A1c and DM complication chart    GFR was 50s, not 60s.     Follow up with nephrology. Motivated today to control DM and HTN to avoid kidney damage      Recent A1c is very high     Target A1c 6.5%  Target BG   BEFORE MEAL 100-130mg/dl  2hrs AFTER MEAL less than 180mg/dl    plan  Will f/u in 2 weeks with [facility] and in 4 weeks with me   Will titrate meds as tolerated       Will give start CGM simone    follow up with [facility] in 2 weeks for CGM download    Will refer to CDE, podiatry, ophthalmology    Will send CGM  Rx - let us know if not covered or expensive     Metformin  increase to 1000 mg twice day. If getting diarrhea, wait and do not increase the dose till the diarrhea resolves.   Farxiga 10 mg daily   Atorvastatin 10 mg daily   Lisinopril 10 mg daily     Will add   Glargine insulin daily 30 units a day   Humalog 10 units with dinner , the largest meal of the day. If needed, will add to the other meals   Start mounjaro 2.5 mg weekly  - let us know if not covered     Walk daily 30 min/day   Stop sugary drinks   Limit simple cabs in diet - grapes are high in  sugar     Will hold on pioglitazone now     Check blood sugar fasting, before meals and before bedtime.   If you have low blood sugar <70, take 15 grams of carb (8 oz juice or regular soda) and recheck in 15 minutes.    Follow up with podiatry and eye doctor annually.   Patients need to wear covered shoes all the time and check feet daily.   Bring your meter/BG log to the next visit.          GLP-1 agonists:  No personal or family history of MEN syndrome   No personal history pancreatitis   Patient counselled regarding side effects including injection site reactions, nausea, vomiting, diarrhea, pancreatitis, gastroparesis and rare side effect candis Julian syndrome.    SGLT2 inhibitors  No UTI or yeast infection   Discussed side effects including UTI and fungal infections.   Discussed the importance of hydration.      We discussed these in detail with the patient and negotiated which of  numerous possible changes in the in the treatment plan that would be acceptable to them. The patient remains at ongoing is at high risk for complications related to uncontrolled diabetes and treatment.  The patient requires a great deal of self-management and support. We expect the patient's risk to be reduced with the changes to the treatment plan that we recommended today.   We reviewed a very large amount of complicated data including BG target range, basal insulin doses, meal and correction related insulin boluses, time of meal, size of meal, time of BG testing and insulin administration in relations to meals. We also reviewed self-testing BG results if available.         PAST MEDICAL HISTORY:   Past Medical History:   Diagnosis Date    Diabetes (HCC)     Essential hypertension        PAST SURGICAL HISTORY:   History reviewed. No pertinent surgical history.  Hernia     CURRENT MEDICATIONS:     Pediatric Multiple Vit-C-FA (MULTIPLE VITAMINS OR) Take by mouth daily.      metFORMIN  MG Oral Tablet 24 Hr Take 2 tablets (1,000 mg total) by mouth 2 (two) times daily with meals. 120 tablet 1    Insulin Glargine-yfgn (SEMGLEE, YFGN,) 100 UNIT/ML Subcutaneous Solution Inject 30 Units into the skin daily. 9 mL 0    Insulin Lispro (HUMALOG KWIKPEN) 200 UNIT/ML Subcutaneous Solution Pen-injector Inject 10 Units into the skin 3 (three) times daily before meals. 45 each 0    Continuous Blood Gluc Sensor (DEXCOM G7 SENSOR) Does not apply Misc 1 each Every 10 days. Use as directed every 10 days 3 each 5    Tirzepatide (MOUNJARO) 2.5 MG/0.5ML Subcutaneous Solution Pen-injector Inject 2.5 mg into the skin every 7 days. 2 each 3    hydroCHLOROthiazide 25 MG Oral Tab Take 1 tablet (25 mg total) by mouth every morning.      atorvastatin 10 MG Oral Tab Take 1 tablet (10 mg total) by mouth nightly.      dapagliflozin 10 MG Oral Tab Take 1 tablet (10 mg total) by mouth daily. 30 tablet 2    lisinopril 10 MG Oral Tab Take 1 tablet  (10 mg total) by mouth daily.           ALLERGIES:  No Known Allergies  None     SOCIAL HISTORY:    Social History     Socioeconomic History    Marital status:    Tobacco Use    Smoking status: Never    Smokeless tobacco: Never   Substance and Sexual Activity    Alcohol use: Not Currently    Drug use: Not Currently       FAMILY HISTORY:   History reviewed. No pertinent family history.   Both parents, brother, GF and aunt with DM    REVIEW OF SYSTEMS:  All negative other than HPI      PHYSICAL EXAM:   Height: 6' 1\" (185.4 cm) (02/15 1104)  Weight: 252 lb (114.3 kg) (02/15 1104)  BSA (Calculated - sq m): 2.37 sq meters (02/15 1104)  Pulse: 89 (02/15 1104)  BP: 132/72 (02/15 1104)  Temp: --  Do Not Use - Resp Rate: --  SpO2: 98 % (02/15 1104)    Body mass index is 33.25 kg/m².  Obese   No abd tenderness   No goiter   No tremors   CONSTITUTIONAL:  Awake and alert. Age appropriate, good hygiene not in acute distress. Well nourished and well developed. no acute distress   PSYCH:   Orientated to time, place, person & situation, Normal mood and affect, memory intact, normal insight and judgment, cooperative  Neuro: speech is clear. Awake, alert, no aphasia, no facial asymmetry, no nuchal rigidity  EYES:  No proptosis, no ptosis, conjunctiva normal  ENT:  Normocephalic, atraumatic  Eye: EOMI, normal lids, no discharge, no conjunctival erythema. No exophthalmos/proptosis, Ptosis negative   No rhinorrhea, moist oral mucosa  Neck: full range of motion  Neck/Thyroid: neck inspection: normal, No scar, No goiter   LUNGS:  No acute respiratory distress, non-labored respiration. Speaking full sentences  CARDIOVASCULAR:  regular rate   ABDOMEN:  No abdominal pain.   SKIN:  no bruising or bleeding, no rashes and no lesions, Skin is dry, no obvious rashes or lesions  EXTREMITIES: no gross abnormality   MSK: Moves extremities spontaneously. full range of motion in all major joints      DATA:     Pertinent data reviewed       Latest Reference Range & Units 24 12:15   HEMOGLOBIN A1c <5.7 % 15.3 (H)   (H): Data is abnormally high   Latest Reference Range & Units 24 15:59   CREATININE 0.70 - 1.30 mg/dL 1.39 (H)   CALCIUM 8.7 - 10.4 mg/dL 9.6   BUN/CREATININE RATIO 10.0 - 20.0  13.7   EGFR >=60 mL/min/1.73m2 63       Latest Reference Range & Units 24 12:15   Cholesterol, Total <200 mg/dL 150   Triglycerides 30 - 149 mg/dL 182 (H)   HDL Cholesterol 40 - 59 mg/dL 35 (L)   VLDL 0 - 30 mg/dL 29   NON-HDL CHOLESTEROL <130 mg/dL 115   LDL Cholesterol Calc <100 mg/dL 84      Latest Reference Range & Units 24 12:15   T4,Free (Direct) 0.8 - 1.7 ng/dL 1.5   TSH 0.550 - 4.780 mIU/mL 1.876      Latest Reference Range & Units 24 15:59   TOTAL PROTEIN URINE RANDOM <14.0 mg/dL 6.2   CREATININE UR RANDOM mg/dL 82.00   MALB URINE mg/dL <0.30   MALB/CRE CALC  See chart for results         No results for input(s): \"TSH\", \"T4F\", \"T3F\", \"THYP\" in the last 72 hours.  No results found.        Orders Placed This Encounter   Procedures    ASSAY QUANTITATIVE, GLUCOSE     Orders Placed This Encounter    ASSAY QUANTITATIVE, GLUCOSE     Order Specific Question:   Release to patient     Answer:   Immediate    Pediatric Multiple Vit-C-FA (MULTIPLE VITAMINS OR)     Sig: Take by mouth daily.    metFORMIN  MG Oral Tablet 24 Hr     Sig: Take 2 tablets (1,000 mg total) by mouth 2 (two) times daily with meals.     Dispense:  120 tablet     Refill:  1    Insulin Glargine-yfgn (SEMGLEE, YFGN,) 100 UNIT/ML Subcutaneous Solution     Sig: Inject 30 Units into the skin daily.     Dispense:  9 mL     Refill:  0    Insulin Lispro (HUMALOG KWIKPEN) 200 UNIT/ML Subcutaneous Solution Pen-injector     Sig: Inject 10 Units into the skin 3 (three) times daily before meals.     Dispense:  45 each     Refill:  0    Continuous Blood Gluc Sensor (DEXCOM G7 SENSOR) Does not apply Misc     Si each Every 10 days. Use as directed every 10 days     Dispense:  3  each     Refill:  5    Tirzepatide (MOUNJARO) 2.5 MG/0.5ML Subcutaneous Solution Pen-injector     Sig: Inject 2.5 mg into the skin every 7 days.     Dispense:  2 each     Refill:  3          This is a specialized patient consultation in endocrinology and required comprehensive review of prior records, as well as current evaluation, with time required for consideration of complex endocrine issues and consultation. For this visit, I personally interviewed the patient, and family member if accompanied, performed the pertinent parts of the history and physical examination. ROS included screening for appropriate endocrine conditions.   Today's diagnosis and plan were reviewed in detail with the patient who states understanding and agrees with plan. I discussed with the patient possible diagnosis, differential diagnosis, need for work up , treatment options, alternatives and side effects.     Please see note for details about time spent which includes:   · pre-visit preparation  · reviewing records  · face to face time with the patient   · timely documentation of the encounter  · ordering medications/tests  · communication with care team  · care coordination    I appreciate the opportunity to be part of your patient's medical care and will keep you, as the referring and primary physicians, informed about the care of your patient, including possible future surgery and pathology findings. Please feel free to contact me should you have any questions.      Tavares Gomez MD

## 2024-02-15 NOTE — PATIENT INSTRUCTIONS
Recent A1c is very high     Target A1c 6.5%  Target BG   BEFORE MEAL 100-130mg/dl  2hrs AFTER MEAL less than 180mg/dl    Will f/u in 2 weeks with [facility] and in 4 weeks with me   Will titrate meds as tolerated     Follow up with nephrology. Motivated today to control DM and HTN to avoid kidney damage    Will give start CGM simone    follow up with [facility] in 2 weeks for CGM download    Will refer to CDE, podiatry, ophthalmology    Will send CGM  Rx - let us know if not covered or expensive     Metformin  increase to 1000 mg twice day. If getting diarrhea, wait and do not increase the dose till the diarrhea resolves.   Farxiga 10 mg daily   Atorvastatin 10 mg daily   Lisinopril 10 mg daily     Will add   Glargine insulin daily 30 units a day   Humalog 10 units with dinner , the largest meal of the day. If needed, will add to the other meals   Start mounjaro 2.5 mg weekly  - let us know if not covered     Walk daily 30 min/day   Stop sugary drinks   Limit simple cabs in diet - grapes are high in  sugar     Will hold on pioglitazone now     Check blood sugar fasting, before meals and before bedtime.   If you have low blood sugar <70, take 15 grams of carb (8 oz juice or regular soda) and recheck in 15 minutes.    Follow up with podiatry and eye doctor annually.   Patients need to wear covered shoes all the time and check feet daily.   Bring your meter/BG log to the next visit.          GLP-1 agonists:  No personal or family history of MEN syndrome   No personal history pancreatitis   Patient counselled regarding side effects including injection site reactions, nausea, vomiting, diarrhea, pancreatitis, gastroparesis and rare side effect candis Julian syndrome.    SGLT2 inhibitors  No UTI or yeast infection   Discussed side effects including UTI and fungal infections.   Discussed the importance of hydration.      We discussed these in detail with the patient and negotiated which of numerous possible changes in the in the  treatment plan that would be acceptable to them. The patient remains at ongoing is at high risk for complications related to uncontrolled diabetes and treatment.  The patient requires a great deal of self-management and support. We expect the patient's risk to be reduced with the changes to the treatment plan that we recommended today.   We reviewed a very large amount of complicated data including BG target range, basal insulin doses, meal and correction related insulin boluses, time of meal, size of meal, time of BG testing and insulin administration in relations to meals. We also reviewed self-testing BG results if available.

## 2024-02-20 ENCOUNTER — TELEPHONE (OUTPATIENT)
Dept: ENDOCRINOLOGY CLINIC | Facility: CLINIC | Age: 49
End: 2024-02-20

## 2024-02-20 DIAGNOSIS — E11.65 UNCONTROLLED TYPE 2 DIABETES MELLITUS WITH HYPERGLYCEMIA (HCC): Primary | ICD-10-CM

## 2024-02-21 ENCOUNTER — OFFICE VISIT (OUTPATIENT)
Dept: NEPHROLOGY | Facility: CLINIC | Age: 49
End: 2024-02-21

## 2024-02-21 VITALS
WEIGHT: 259 LBS | HEART RATE: 97 BPM | SYSTOLIC BLOOD PRESSURE: 128 MMHG | DIASTOLIC BLOOD PRESSURE: 76 MMHG | BODY MASS INDEX: 34.33 KG/M2 | HEIGHT: 73 IN

## 2024-02-21 DIAGNOSIS — N18.2 CKD (CHRONIC KIDNEY DISEASE) STAGE 2, GFR 60-89 ML/MIN: Primary | ICD-10-CM

## 2024-02-21 PROCEDURE — 99213 OFFICE O/P EST LOW 20 MIN: CPT | Performed by: INTERNAL MEDICINE

## 2024-02-21 NOTE — TELEPHONE ENCOUNTER
Dr. Gomez,  Per patient: dexcom G7 needs PA - PA completed via [employer]  Mounjaro 2.5mg on b/o - please advise   Per pharmacist: lantus perferred (semglee on b/o) - RX for lantus pended for approval  Thanks

## 2024-02-21 NOTE — PROGRESS NOTES
02/21/24        Patient: Pamela Bagley   YOB: 1975   Date of Visit: 2/21/2024       Dear  Dr. Nick MD,      Thank you for referring Pamela Bagley to my practice.  Please find my assessment and plan below.      As you know he is a 48-year-old male with a history of hypertension and adult onset diabetes mellitus who I now had the pleasure of seeing for what may now be chronic kidney disease stage II.  Patient just underwent a recent renal evaluation.  Of note is that a sed rate, connective tissue profile, and random urine for Bence-Astorga protein was nonrevealing.  Urinalysis was normal as was his urine microalbumin to creatinine ratio.  Renal ultrasound showed a simple cyst in the right kidney but otherwise was unremarkable.  The liver was compatible with fatty infiltration.  Finally his creatinine done in February 2, 2024 was better down to 1.39 now with an estimated GFR of 63 cc/min.    I therefore informed the patient that his renal function has improved.  Now with chronic kidney disease stage II.  The patient's blood sugars had been out of control with an A1c of 15.3.  This was causing polyuria as well as nocturia leading to some component of volume depletion.  He states he has been working now with endocrine, is trying to watch his diet better and working out at the gym.  With this there has been improvement in his blood sugars but also his renal function.    Encouraged him to continue to work with endocrine to get his A1c is down below 7.  Otherwise he knows to maintain adequate hydration.  Avoid nonsteroidals.  Blood pressures are under good control.  Will have him repeat a CBC and renal panel in 3 months to ensure stability.  Will see again in 6 to 12 months depending upon clinical course.    Thank you again for allowing me to participate in the care of your patient.  If you have any questions please feel free to call.           Sincerely,   Donato Melendez MD   [facility]  [facility], [facility]  [address]  [place] [id number]    Document electronically generated by:  Donato Melendez MD

## 2024-02-21 NOTE — PATIENT INSTRUCTIONS
Continue to work with endocrine to get your blood sugars under better control.  Repeat your kidney blood test in 3 months.  Orders are in the computer.

## 2024-02-22 RX ORDER — INSULIN GLARGINE 100 [IU]/ML
30 INJECTION, SOLUTION SUBCUTANEOUS NIGHTLY
Qty: 9 ML | Refills: 0 | Status: SHIPPED | OUTPATIENT
Start: 2024-02-22

## 2024-02-27 ENCOUNTER — OFFICE VISIT (OUTPATIENT)
Facility: LOCATION | Age: 49
End: 2024-02-27

## 2024-02-27 ENCOUNTER — TELEPHONE (OUTPATIENT)
Dept: ENDOCRINOLOGY CLINIC | Facility: CLINIC | Age: 49
End: 2024-02-27

## 2024-02-27 DIAGNOSIS — M19.071 ARTHRITIS OF RIGHT MIDFOOT: ICD-10-CM

## 2024-02-27 DIAGNOSIS — B35.1 ONYCHOMYCOSIS: ICD-10-CM

## 2024-02-27 DIAGNOSIS — E11.9 TYPE 2 DIABETES MELLITUS WITHOUT COMPLICATION, UNSPECIFIED WHETHER LONG TERM INSULIN USE (HCC): Primary | ICD-10-CM

## 2024-02-27 DIAGNOSIS — M89.8X7 EXOSTOSIS OF RIGHT FOOT: ICD-10-CM

## 2024-02-27 PROCEDURE — 99203 OFFICE O/P NEW LOW 30 MIN: CPT | Performed by: PODIATRIST

## 2024-02-27 RX ORDER — CICLOPIROX 80 MG/ML
SOLUTION TOPICAL
Qty: 19.8 EACH | Refills: 2 | Status: SHIPPED | OUTPATIENT
Start: 2024-02-27

## 2024-02-27 NOTE — PROGRESS NOTES
[facility]  Progress Note    Pamela Bagley is a 48 year old male.   Chief Complaint   Patient presents with    Diabetic Foot Care     Consult - DM foot check and nail care - last A1C 15.3 on 1/19/24 - did not check BG this am, was 123 last night - only complaint as far as feet is that the top of right foot gets sore intermittently - 5-6/10 pain, happens a couple times a week - denies numbness, tingling,swelling         HPI:     Patient is a pleasant 48-year-old diabetic male who is presenting to clinic today for diabetic foot evaluation.  Patient states he is doing well overall, but does relate having intermittent pain to the top of his right midfoot.  He states that the pain usually occurs after long days on his feet when he is resting at home.  Rates the pain anywhere from 5-6/10.  He states that it does happen a couple times a week.  Denies any injuries.  Patient also has concerns in regards to his discolored right great toenail.  He has tried over-the-counter regimens, but is still experiencing some discoloration to the nail itself.  Hoping to discuss options today.  Denies any numbness, burning, or tingling to his feet.  No other concerns and here for further evaluation and care.  Denies recent nausea, vomiting, fever, chills.  Patient's most recent hemoglobin A1c was 15.3% on 1/19/2024      Allergies: Patient has no known allergies.   Current Outpatient Medications   Medication Sig Dispense Refill    Ciclopirox 8 % External Solution Apply 1-2 drops nightly to affected toenails 19.8 each 2    insulin glargine 100 UNIT/ML Subcutaneous Solution Inject 30 Units into the skin nightly. 9 mL 0    Pediatric Multiple Vit-C-FA (MULTIPLE VITAMINS OR) Take by mouth daily.      metFORMIN  MG Oral Tablet 24 Hr Take 2 tablets (1,000 mg total) by mouth 2 (two) times daily with meals. 120 tablet 1    Insulin Glargine-yfgn (SEMGLEE, YFGN,) 100 UNIT/ML Subcutaneous Solution Inject 30 Units into the skin  daily. 9 mL 0    Insulin Lispro (HUMALOG KWIKPEN) 200 UNIT/ML Subcutaneous Solution Pen-injector Inject 10 Units into the skin 3 (three) times daily before meals. 45 each 0    Continuous Blood Gluc Sensor (DEXCOM G7 SENSOR) Does not apply Misc 1 each Every 10 days. Use as directed every 10 days 3 each 5    hydroCHLOROthiazide 25 MG Oral Tab Take 1 tablet (25 mg total) by mouth every morning.      atorvastatin 10 MG Oral Tab Take 1 tablet (10 mg total) by mouth nightly.      dapagliflozin 10 MG Oral Tab Take 1 tablet (10 mg total) by mouth daily. 30 tablet 2    lisinopril 10 MG Oral Tab Take 1 tablet (10 mg total) by mouth daily.      Tirzepatide (MOUNJARO) 2.5 MG/0.5ML Subcutaneous Solution Pen-injector Inject 2.5 mg into the skin every 7 days. (Patient not taking: Reported on 2/27/2024) 2 each 3    amoxicillin 500 MG Oral Cap Take 1 capsule (500 mg total) by mouth 2 (two) times daily. (Patient not taking: Reported on 2/27/2024)        Past Medical History:   Diagnosis Date    Diabetes (HCC)     Essential hypertension       No past surgical history on file.   No family history on file.   Social History     Socioeconomic History    Marital status:    Tobacco Use    Smoking status: Never    Smokeless tobacco: Never   Substance and Sexual Activity    Alcohol use: Not Currently    Drug use: Not Currently           REVIEW OF SYSTEMS:     10 point ROS completed and was negative, except for pertinent positive and negatives stated in subjective.       EXAM:     GENERAL: well developed, well nourished, in no apparent distress  EXTREMITIES:  1. Integument: Bilateral hallux toenails are thickened, dystrophic, discolored with subungual debris.  There does appear to be proximal clearing noted.  Left fifth digit toenail is also thickened and discolored.  Remaining toenails x 7 are of normal morphology and adequate length.  Skin appears moist, warm, and supple with positive hair growth. There are no color changes. No open  lesions. No macerations. No Hyperkeratotic lesions.   2. Vascular: Dorsalis pedis 2/4 bilateral and posterior tibial pulses 2/4 bilateral, capillary refill normal.  3. Neurological: Gross sensation intact via light touch bilaterally.  Protective sensation intact via Tivoli test to bilateral feet  4. Musculoskeletal: All muscle groups are graded 5/5 in the foot and ankle.  No acute deformities noted.  Palpable exostosis to dorsal right midfoot with some pain elicited with palpation.      ASSESSMENT AND PLAN:   Diagnoses and all orders for this visit:    Type 2 diabetes mellitus without complication, unspecified whether long term insulin use (HCC)    Onychomycosis  -     Ciclopirox 8 % External Solution; Apply 1-2 drops nightly to affected toenails    Exostosis of right foot    Arthritis of right midfoot        Plan:   -Patient was seen and evaluated today in clinic.    -Discussed importance of proper pedal hygiene, daily foot checks, and tight glycemic control.    -Patient to avoid walking barefoot. Recommend ambulating with supportive diabetic shoes and inserts.    -Patient to monitor for acute signs of infection and seek immediate medical attention if any signs of infection or other concerns arise.    -Reviewed treatment options for nail dystrophy / onychomycosis including:   -No treatment and monitoring, nail debridement, topical meds, oral meds, and nail avulsion.  -Advantages and disadvantages of each reviewed. Using oral agents would require regular blood test monitoring due to risk of hepatotoxicity and other adverse reactions including drug interactions.   -Topical meds are much safer yet carry very low efficacy.  -Patient can also try home remedies such as soaking their feet in warm water and apple cider vinegar for 10 minutes a day, as well as utilizing Vicks vapor rub to the top of the nails daily.  -Pt has opted for ciclopirox  -Rx: Ciclopirox today    Patient's right midfoot pain does appear to be  arthritic in nature with palpable exostosis.  I did recommend trying over-the-counter Voltaren gel.  Also explained that we can look into injections in the future if needed.  Will obtain baseline right foot radiographs at next visit if patient has not improved.    -The patient indicates understanding of these issues and agrees to the plan.    Time spent reviewing pertinent information from patient's chart, reviewing any pertinent imaging, obtaining history and physical exam, discussing and mutually agreeing on a treatment plan, and documenting encounter: 35 minutes    RTC 3 months      Dominguez Diallo DPM        2/27/2024    Dragon speech recognition software was used to prepare this note.  Errors in word recognition may occur.  Please contact me with any questions/concerns with this note.

## 2024-03-05 ENCOUNTER — NURSE TRIAGE (OUTPATIENT)
Dept: INTERNAL MEDICINE CLINIC | Facility: CLINIC | Age: 49
End: 2024-03-05

## 2024-03-05 NOTE — TELEPHONE ENCOUNTER
Action Requested: Summary for Provider     []  Critical Lab, Recommendations Needed  [] Need Additional Advice  []   FYI    []   Need Orders  [] Need Medications Sent to Pharmacy  []  Other     SUMMARY: Patient called to schedule appointment with Dr Romero.  Reports for past month he gets sensation of \"tightness\" in his stomach.  Denies katja chest pain, numbness or tingling in arm, neck, shoulder or jaw, but feels pressure in his abdomen that comes and goes.  Also reports  history of hemorrhoids and has seen small amount of blood in his stool.  Has had some constipation and some looser stool.  He had EGD and colonoscopy at [place] 4/04/2023 for post-prandial chest pain and epigastric tightness despite omeprazole   Notes state both exams were normal.    Plan:  Scheduled appointment tomorrow in [place] office.  Patient was instructed to go to ER if he develops chest pain.  Advised trial of OTC Preparation H for hemorrhoids.    Future Appointments   Date Time Provider Department Center   3/6/2024 11:00 AM [facility]   3/14/2024 11:00 AM Tavares Gomez MD [id number]   5/28/2024 10:15 AM Truman Diallo DPM [facility]  [facility]   7/3/2024  3:00 PM Bandar Cao MD [facility]       Reason for call: Acute  Sensation of tightness in stomach, also hemorrhoids with blood in stool  Onset: over the past month    Reason for Disposition   Patient wants to be seen    Protocols used: Abdominal Pain - Upper-A-[place]

## 2024-03-06 ENCOUNTER — OFFICE VISIT (OUTPATIENT)
Dept: INTERNAL MEDICINE CLINIC | Facility: CLINIC | Age: 49
End: 2024-03-06

## 2024-03-06 ENCOUNTER — LAB ENCOUNTER (OUTPATIENT)
Dept: LAB | Age: 49
End: 2024-03-06
Attending: INTERNAL MEDICINE
Payer: COMMERCIAL

## 2024-03-06 VITALS
WEIGHT: 255 LBS | RESPIRATION RATE: 17 BRPM | DIASTOLIC BLOOD PRESSURE: 83 MMHG | HEART RATE: 77 BPM | BODY MASS INDEX: 33.8 KG/M2 | SYSTOLIC BLOOD PRESSURE: 124 MMHG | HEIGHT: 73 IN | OXYGEN SATURATION: 99 % | TEMPERATURE: 98 F

## 2024-03-06 DIAGNOSIS — R10.13 EPIGASTRIC PAIN: ICD-10-CM

## 2024-03-06 DIAGNOSIS — K64.4 EXTERNAL HEMORRHOID, BLEEDING: ICD-10-CM

## 2024-03-06 DIAGNOSIS — K59.00 CONSTIPATION, UNSPECIFIED CONSTIPATION TYPE: ICD-10-CM

## 2024-03-06 DIAGNOSIS — K64.4 EXTERNAL HEMORRHOID, BLEEDING: Primary | ICD-10-CM

## 2024-03-06 LAB
BASOPHILS # BLD AUTO: 0.03 X10(3) UL (ref 0–0.2)
BASOPHILS NFR BLD AUTO: 0.6 %
DEPRECATED RDW RBC AUTO: 45.6 FL (ref 35.1–46.3)
EOSINOPHIL # BLD AUTO: 0.21 X10(3) UL (ref 0–0.7)
EOSINOPHIL NFR BLD AUTO: 4.5 %
ERYTHROCYTE [DISTWIDTH] IN BLOOD BY AUTOMATED COUNT: 13 % (ref 11–15)
HCT VFR BLD AUTO: 45.4 %
HGB BLD-MCNC: 15.2 G/DL
IMM GRANULOCYTES # BLD AUTO: 0.01 X10(3) UL (ref 0–1)
IMM GRANULOCYTES NFR BLD: 0.2 %
LYMPHOCYTES # BLD AUTO: 1.52 X10(3) UL (ref 1–4)
LYMPHOCYTES NFR BLD AUTO: 32.7 %
MCH RBC QN AUTO: 31.7 PG (ref 26–34)
MCHC RBC AUTO-ENTMCNC: 33.5 G/DL (ref 31–37)
MCV RBC AUTO: 94.6 FL
MONOCYTES # BLD AUTO: 0.61 X10(3) UL (ref 0.1–1)
MONOCYTES NFR BLD AUTO: 13.1 %
NEUTROPHILS # BLD AUTO: 2.27 X10 (3) UL (ref 1.5–7.7)
NEUTROPHILS # BLD AUTO: 2.27 X10(3) UL (ref 1.5–7.7)
NEUTROPHILS NFR BLD AUTO: 48.9 %
PLATELET # BLD AUTO: 223 10(3)UL (ref 150–450)
RBC # BLD AUTO: 4.8 X10(6)UL
WBC # BLD AUTO: 4.7 X10(3) UL (ref 4–11)

## 2024-03-06 PROCEDURE — 99214 OFFICE O/P EST MOD 30 MIN: CPT | Performed by: INTERNAL MEDICINE

## 2024-03-06 PROCEDURE — 36415 COLL VENOUS BLD VENIPUNCTURE: CPT

## 2024-03-06 PROCEDURE — 85025 COMPLETE CBC W/AUTO DIFF WBC: CPT

## 2024-03-06 RX ORDER — PANTOPRAZOLE SODIUM 40 MG/1
40 TABLET, DELAYED RELEASE ORAL
Qty: 30 TABLET | Refills: 0 | Status: SHIPPED | OUTPATIENT
Start: 2024-03-06

## 2024-03-06 NOTE — PROGRESS NOTES
Subjective:   Patient ID: Nicolle Bagley is a 48 year old male.    HPI  Patient comes in today with complaint of having some blood in his stool with being constipated he does have history of hemorrhoids last time he moved his bowels was yesterday saw some blood concerned about that also has been having some abdominal bloating sometimes worse with food sometimes this is an ongoing problem patient has had colonoscopy and EGD done last year at [place] he has diabetes last A1c was very high but he says his blood sugars lately has been much better he is taking his meds  History/Other:   Review of Systems   Constitutional: Negative.    HENT: Negative.     Eyes: Negative.    Respiratory: Negative.     Cardiovascular: Negative.    Gastrointestinal:  Positive for abdominal distention, abdominal pain and blood in stool.   Genitourinary: Negative.    Musculoskeletal: Negative.    Skin: Negative.    Neurological: Negative.    Psychiatric/Behavioral: Negative.       Current Outpatient Medications   Medication Sig Dispense Refill    Ciclopirox 8 % External Solution Apply 1-2 drops nightly to affected toenails 19.8 each 2    insulin glargine 100 UNIT/ML Subcutaneous Solution Inject 30 Units into the skin nightly. 9 mL 0    Pediatric Multiple Vit-C-FA (MULTIPLE VITAMINS OR) Take by mouth daily.      metFORMIN  MG Oral Tablet 24 Hr Take 2 tablets (1,000 mg total) by mouth 2 (two) times daily with meals. 120 tablet 1    Insulin Glargine-yfgn (SEMGLEE, YFGN,) 100 UNIT/ML Subcutaneous Solution Inject 30 Units into the skin daily. 9 mL 0    Insulin Lispro (HUMALOG KWIKPEN) 200 UNIT/ML Subcutaneous Solution Pen-injector Inject 10 Units into the skin 3 (three) times daily before meals. 45 each 0    Continuous Blood Gluc Sensor (DEXCOM G7 SENSOR) Does not apply Misc 1 each Every 10 days. Use as directed every 10 days 3 each 5    hydroCHLOROthiazide 25 MG Oral Tab Take 1 tablet (25 mg total) by mouth every morning.      atorvastatin  10 MG Oral Tab Take 1 tablet (10 mg total) by mouth nightly.      dapagliflozin 10 MG Oral Tab Take 1 tablet (10 mg total) by mouth daily. 30 tablet 2    lisinopril 10 MG Oral Tab Take 1 tablet (10 mg total) by mouth daily.      Tirzepatide (MOUNJARO) 2.5 MG/0.5ML Subcutaneous Solution Pen-injector Inject 2.5 mg into the skin every 7 days. (Patient not taking: Reported on 2/27/2024) 2 each 3    amoxicillin 500 MG Oral Cap Take 1 capsule (500 mg total) by mouth 2 (two) times daily. (Patient not taking: Reported on 2/27/2024)       Allergies:No Known Allergies    Objective:   Physical Exam  Vitals and nursing note reviewed.   Constitutional:       Appearance: He is well-developed.   HENT:      Head: Normocephalic and atraumatic.      Right Ear: External ear normal.      Left Ear: External ear normal.      Nose: Nose normal.   Eyes:      Conjunctiva/sclera: Conjunctivae normal.      Pupils: Pupils are equal, round, and reactive to light.   Cardiovascular:      Rate and Rhythm: Normal rate and regular rhythm.      Heart sounds: Normal heart sounds.   Pulmonary:      Effort: Pulmonary effort is normal.      Breath sounds: Normal breath sounds.   Abdominal:      General: Bowel sounds are normal. There is no distension.      Palpations: Abdomen is soft.      Tenderness: There is no abdominal tenderness.   Genitourinary:     Penis: Normal.       Prostate: Normal.      Comments: External hemorrhoids, not bleeding right now not inflamed   Musculoskeletal:         General: Normal range of motion.      Cervical back: Normal range of motion and neck supple.   Skin:     General: Skin is warm and dry.   Neurological:      Mental Status: He is alert and oriented to person, place, and time.      Deep Tendon Reflexes: Reflexes are normal and symmetric.         Assessment & Plan:   1. External hemorrhoid, bleeding will refer to surgeon also will check CBC to make sure no significant bleed   2. Epigastric pain possible gastritis  possible gallbladder disease we will order an ultrasound we will start him on PPI will follow any worsening symptoms let us now   3. Constipation, unspecified constipation type patient is drinking enough fluid is a lot of fiber as need medication       Orders Placed This Encounter   Procedures    CBC With Differential With Platelet       Meds This Visit:  Requested Prescriptions      No prescriptions requested or ordered in this encounter       Imaging & Referrals:  SURGERY - INTERNAL  US ABDOMEN LIMITED (CPT=76705)

## 2024-04-01 ENCOUNTER — OFFICE VISIT (OUTPATIENT)
Dept: INTERNAL MEDICINE CLINIC | Facility: CLINIC | Age: 49
End: 2024-04-01

## 2024-04-01 VITALS
WEIGHT: 254 LBS | BODY MASS INDEX: 33.66 KG/M2 | DIASTOLIC BLOOD PRESSURE: 74 MMHG | SYSTOLIC BLOOD PRESSURE: 124 MMHG | HEART RATE: 98 BPM | OXYGEN SATURATION: 99 % | HEIGHT: 73 IN | TEMPERATURE: 98 F | RESPIRATION RATE: 18 BRPM

## 2024-04-01 DIAGNOSIS — K59.00 CONSTIPATION, UNSPECIFIED CONSTIPATION TYPE: ICD-10-CM

## 2024-04-01 DIAGNOSIS — R10.13 EPIGASTRIC PAIN: ICD-10-CM

## 2024-04-01 DIAGNOSIS — N18.30 STAGE 3 CHRONIC KIDNEY DISEASE, UNSPECIFIED WHETHER STAGE 3A OR 3B CKD (HCC): ICD-10-CM

## 2024-04-01 DIAGNOSIS — I25.10 MILD CAD: ICD-10-CM

## 2024-04-01 DIAGNOSIS — I10 PRIMARY HYPERTENSION: ICD-10-CM

## 2024-04-01 DIAGNOSIS — E11.9 COMPREHENSIVE DIABETIC FOOT EXAMINATION, TYPE 2 DM, ENCOUNTER FOR (HCC): ICD-10-CM

## 2024-04-01 DIAGNOSIS — E11.22 TYPE 2 DIABETES MELLITUS WITH STAGE 3 CHRONIC KIDNEY DISEASE, WITHOUT LONG-TERM CURRENT USE OF INSULIN, UNSPECIFIED WHETHER STAGE 3A OR 3B CKD (HCC): ICD-10-CM

## 2024-04-01 DIAGNOSIS — N18.30 TYPE 2 DIABETES MELLITUS WITH STAGE 3 CHRONIC KIDNEY DISEASE, WITHOUT LONG-TERM CURRENT USE OF INSULIN, UNSPECIFIED WHETHER STAGE 3A OR 3B CKD (HCC): ICD-10-CM

## 2024-04-01 DIAGNOSIS — Z00.00 ANNUAL PHYSICAL EXAM: Primary | ICD-10-CM

## 2024-04-01 DIAGNOSIS — E78.2 MIXED HYPERLIPIDEMIA: ICD-10-CM

## 2024-04-01 DIAGNOSIS — K64.4 EXTERNAL HEMORRHOID, BLEEDING: ICD-10-CM

## 2024-04-01 DIAGNOSIS — K92.1 BLOOD IN STOOL: ICD-10-CM

## 2024-04-01 PROCEDURE — 99396 PREV VISIT EST AGE 40-64: CPT | Performed by: INTERNAL MEDICINE

## 2024-04-04 ENCOUNTER — TELEPHONE (OUTPATIENT)
Facility: CLINIC | Age: 49
End: 2024-04-04

## 2024-04-04 RX ORDER — PANTOPRAZOLE SODIUM 40 MG/1
40 TABLET, DELAYED RELEASE ORAL
Qty: 30 TABLET | Refills: 3 | Status: SHIPPED | OUTPATIENT
Start: 2024-04-04

## 2024-04-04 NOTE — TELEPHONE ENCOUNTER
REFILL PASSED PER [facility] PROTOCOLS    Requested Prescriptions   Pending Prescriptions Disp Refills    PANTOPRAZOLE 40 MG Oral Tab EC [Pharmacy Med Name: PANTOPRAZOLE SOD DR 40 MG TAB] 30 tablet 0     Sig: TAKE 1 TABLET BY MOUTH EVERY DAY IN THE MORNING BEFORE BREAKFAST       Gastrointestional Medication Protocol Passed - 4/2/2024 12:52 AM        Passed - In person appointment or virtual visit in the past 12 mos or appointment in next 3 mos     Recent Outpatient Visits              3 days ago Annual physical exam    [facility], Sonny Pardo Agron B, MD    Office Visit    4 weeks ago External hemorrhoid, bleeding    [facility], [place]    Office Visit    1 month ago Type 2 diabetes mellitus without complication, unspecified whether long term insulin use (HCC)    [facility], [address] Truman Diallo DPM    Office Visit    1 month ago CKD (chronic kidney disease) stage 2, GFR 60-89 ml/min    [facility], [facility] Donato Melendez MD    Office Visit    1 month ago Uncontrolled type 2 diabetes mellitus with hyperglycemia (HCC)    [facility], [facility] Tavares Gomez MD    Office Visit          Future Appointments         Provider Department Appt Notes    In 2 weeks PF [address]     In 1 month Truman Diallo DPM [facility], [address], [place] DM foot care    In 3 months Bandar Cao MD [facility]                    Future Appointments         Provider Department Appt Notes    In 2 weeks PF US [address] Ultrasound [facility]     In 1 month Truman Diallo DPM [facility], [address], [place] DM foot care    In 3 months Bandar Cao MD [facility],  [facility], [place] diabetic           Recent Outpatient Visits              3 days ago Annual physical exam    [facility], Sonny Pardo Agron B, MD    Office Visit    4 weeks ago External hemorrhoid, bleeding    [facility], [place] Pardo Agron B, MD    Office Visit    1 month ago Type 2 diabetes mellitus without complication, unspecified whether long term insulin use (HCC)    [facility], [address], [place] Truman Diallo DPM    Office Visit    1 month ago CKD (chronic kidney disease) stage 2, GFR 60-89 ml/min    [facility], [facility], [place] Donato Melendez MD    Office Visit    1 month ago Uncontrolled type 2 diabetes mellitus with hyperglycemia (HCC)    [facility], [facility] Tavares Gomez MD    Office Visit

## 2024-04-04 NOTE — TELEPHONE ENCOUNTER
Patient has 5/24/2024 Consult with APRN but requesting to be seen sooner regarding Epigastric Pain and Blood In Stool.  Pleaser call.  Thank you.

## 2024-04-04 NOTE — TELEPHONE ENCOUNTER
Marly    Patient reported blood in stool to PCP (Dr. Romero here at [facility]).  We have no regular appointments to get him in within 30 days as recommended if blood in stool with any providers.  May I offer one of your open spots?    Thank you

## 2024-04-05 NOTE — TELEPHONE ENCOUNTER
Patient contacted, accepted below appointment, and given detailed office directions.    Patient aware I canceled the May appointment with Cecile since we got him in sooner.    Your Appointments      Monday April 22, 2024 11:30 AM  Consult with Marly Melchor PA-C  [facility] ([facility]) [address]  [facility] [address]  [phone]

## 2024-04-06 NOTE — PROGRESS NOTES
Subjective:     Patient ID: Nicolle Bagley is a 49 year old male.    HPI  Patient comes in for annual physical recently seen with blood in stool hemoglobin was stable patient does have history of hemorrhoids continues to have on and off blood in the stool denies any other complaints no chest pain or shortness of breath  History/Other:   Review of Systems   Constitutional: Negative.  Negative for fatigue and fever.   HENT: Negative.  Negative for congestion.    Eyes: Negative.    Respiratory: Negative.  Negative for cough, shortness of breath and wheezing.    Cardiovascular: Negative.  Negative for chest pain, palpitations and leg swelling.   Gastrointestinal:  Positive for blood in stool.   Endocrine: Negative for cold intolerance and heat intolerance.   Genitourinary: Negative.  Negative for dysuria, flank pain and hematuria.   Musculoskeletal: Negative.  Negative for arthralgias, back pain and myalgias.   Skin: Negative.    Neurological: Negative.  Negative for dizziness, tremors, syncope, weakness and headaches.   Psychiatric/Behavioral: Negative.  Negative for agitation, behavioral problems and suicidal ideas. The patient is not nervous/anxious.      Current Outpatient Medications   Medication Sig Dispense Refill    pantoprazole 40 MG Oral Tab EC Take 1 tablet (40 mg total) by mouth before breakfast. 30 tablet 3    Ciclopirox 8 % External Solution Apply 1-2 drops nightly to affected toenails 19.8 each 2    insulin glargine 100 UNIT/ML Subcutaneous Solution Inject 30 Units into the skin nightly. 9 mL 0    Pediatric Multiple Vit-C-FA (MULTIPLE VITAMINS OR) Take by mouth daily.      metFORMIN  MG Oral Tablet 24 Hr Take 2 tablets (1,000 mg total) by mouth 2 (two) times daily with meals. 120 tablet 1    Insulin Lispro (HUMALOG KWIKPEN) 200 UNIT/ML Subcutaneous Solution Pen-injector Inject 10 Units into the skin 3 (three) times daily before meals. 45 each 0    Continuous Blood Gluc Sensor (DEXCOM G7 SENSOR)  Does not apply Misc 1 each Every 10 days. Use as directed every 10 days 3 each 5    hydroCHLOROthiazide 25 MG Oral Tab Take 1 tablet (25 mg total) by mouth every morning.      atorvastatin 10 MG Oral Tab Take 1 tablet (10 mg total) by mouth nightly.      dapagliflozin 10 MG Oral Tab Take 1 tablet (10 mg total) by mouth daily. 30 tablet 2    lisinopril 10 MG Oral Tab Take 1 tablet (10 mg total) by mouth daily.       Allergies:No Known Allergies    Past Medical History:   Diagnosis Date    Diabetes (HCC)     Essential hypertension       No past surgical history on file.   No family history on file.   Social History:   Social History     Socioeconomic History    Marital status:    Tobacco Use    Smoking status: Never    Smokeless tobacco: Never   Substance and Sexual Activity    Alcohol use: Not Currently    Drug use: Not Currently        Objective:   Physical Exam  Vitals and nursing note reviewed.   Constitutional:       Appearance: He is well-developed.   HENT:      Head: Normocephalic and atraumatic.      Right Ear: External ear normal.      Left Ear: External ear normal.      Nose: Nose normal.   Eyes:      Conjunctiva/sclera: Conjunctivae normal.      Pupils: Pupils are equal, round, and reactive to light.   Cardiovascular:      Rate and Rhythm: Normal rate and regular rhythm.      Heart sounds: Normal heart sounds.   Pulmonary:      Effort: Pulmonary effort is normal.      Breath sounds: Normal breath sounds.   Abdominal:      General: Bowel sounds are normal.      Palpations: Abdomen is soft.   Genitourinary:     Penis: Normal.       Prostate: Normal.      Rectum: Normal.   Musculoskeletal:         General: Normal range of motion.      Cervical back: Normal range of motion and neck supple.   Skin:     General: Skin is warm and dry.   Neurological:      Mental Status: He is alert and oriented to person, place, and time.      Deep Tendon Reflexes: Reflexes are normal and symmetric.         Assessment &  Plan:   1. Annual physical exam -exam is okay will order labs   2. Type 2 diabetes mellitus with stage 3 chronic kidney disease, without long-term current use of insulin, unspecified whether stage 3a or 3b CKD (HCC) continue current treatment we will test    3. Primary hypertension well-controlled continue current treatment   4. External hemorrhoid, bleeding follows with GI   5. Epigastric pain as above follow-up with GI   6. Constipation, unspecified constipation type increase fiber intake increase fluid intake   7. Mild CAD medical management follows with cardiology she did understand and now   8. Stage 3 chronic kidney disease, unspecified whether stage 3a or 3b CKD (HCC) will follow increase fluid intake   9. Mixed hyperlipidemia we will retest   10. Comprehensive diabetic foot examination, type 2 DM, encounter for (HCC) Bilateral barefoot skin diabetic exam is normal, visualized feet and the appearance is normal.  Bilateral monofilament/sensation of both feet is normal.  Pulsation pedal pulse exam of both lower legs/feet is normal as well.       11. Blood in stool as above due to hemorrhoids follow with gi       Orders Placed This Encounter   Procedures    CBC With Differential With Platelet    Comp Metabolic Panel (14)    Lipid Panel    TSH W Reflex To Free T4    Urinalysis, Routine    PSA Total, Screen    Hemoglobin A1C    Microalb/Creat Ratio, Random Urine       Meds This Visit:  Requested Prescriptions      No prescriptions requested or ordered in this encounter       Imaging & Referrals:  OPHTHALMOLOGY - INTERNAL  GASTRO - INTERNAL  EKG 12-LEAD

## 2024-04-08 ENCOUNTER — LAB ENCOUNTER (OUTPATIENT)
Dept: LAB | Facility: HOSPITAL | Age: 49
End: 2024-04-08
Attending: INTERNAL MEDICINE
Payer: COMMERCIAL

## 2024-04-08 DIAGNOSIS — I25.10 MILD CAD: ICD-10-CM

## 2024-04-08 DIAGNOSIS — N18.30 STAGE 3 CHRONIC KIDNEY DISEASE, UNSPECIFIED WHETHER STAGE 3A OR 3B CKD (HCC): ICD-10-CM

## 2024-04-08 DIAGNOSIS — K92.1 BLOOD IN STOOL: ICD-10-CM

## 2024-04-08 DIAGNOSIS — I10 PRIMARY HYPERTENSION: ICD-10-CM

## 2024-04-08 DIAGNOSIS — K59.00 CONSTIPATION, UNSPECIFIED CONSTIPATION TYPE: ICD-10-CM

## 2024-04-08 DIAGNOSIS — E11.22 TYPE 2 DIABETES MELLITUS WITH STAGE 3 CHRONIC KIDNEY DISEASE, WITHOUT LONG-TERM CURRENT USE OF INSULIN, UNSPECIFIED WHETHER STAGE 3A OR 3B CKD (HCC): ICD-10-CM

## 2024-04-08 DIAGNOSIS — E78.2 MIXED HYPERLIPIDEMIA: ICD-10-CM

## 2024-04-08 DIAGNOSIS — R10.13 EPIGASTRIC PAIN: ICD-10-CM

## 2024-04-08 DIAGNOSIS — N18.30 TYPE 2 DIABETES MELLITUS WITH STAGE 3 CHRONIC KIDNEY DISEASE, WITHOUT LONG-TERM CURRENT USE OF INSULIN, UNSPECIFIED WHETHER STAGE 3A OR 3B CKD (HCC): ICD-10-CM

## 2024-04-08 DIAGNOSIS — K64.4 EXTERNAL HEMORRHOID, BLEEDING: ICD-10-CM

## 2024-04-08 DIAGNOSIS — E11.9 COMPREHENSIVE DIABETIC FOOT EXAMINATION, TYPE 2 DM, ENCOUNTER FOR (HCC): ICD-10-CM

## 2024-04-08 DIAGNOSIS — Z00.00 ANNUAL PHYSICAL EXAM: ICD-10-CM

## 2024-04-08 LAB
ALBUMIN SERPL-MCNC: 4.7 G/DL (ref 3.2–4.8)
ALBUMIN/GLOB SERPL: 1.5 {RATIO} (ref 1–2)
ALP LIVER SERPL-CCNC: 45 U/L
ALT SERPL-CCNC: 39 U/L
ANION GAP SERPL CALC-SCNC: 5 MMOL/L (ref 0–18)
AST SERPL-CCNC: 32 U/L (ref ?–34)
ATRIAL RATE: 72 BPM
BASOPHILS # BLD AUTO: 0.07 X10(3) UL (ref 0–0.2)
BASOPHILS NFR BLD AUTO: 1 %
BILIRUB SERPL-MCNC: 1.1 MG/DL (ref 0.3–1.2)
BILIRUB UR QL: NEGATIVE
BUN BLD-MCNC: 18 MG/DL (ref 9–23)
BUN/CREAT SERPL: 13.5 (ref 10–20)
CALCIUM BLD-MCNC: 10.1 MG/DL (ref 8.7–10.4)
CHLORIDE SERPL-SCNC: 103 MMOL/L (ref 98–112)
CHOLEST SERPL-MCNC: 131 MG/DL (ref ?–200)
CLARITY UR: CLEAR
CO2 SERPL-SCNC: 29 MMOL/L (ref 21–32)
COLOR UR: COLORLESS
COMPLEXED PSA SERPL-MCNC: 0.4 NG/ML (ref ?–4)
CREAT BLD-MCNC: 1.33 MG/DL
CREAT UR-SCNC: 94.5 MG/DL
DEPRECATED RDW RBC AUTO: 41.7 FL (ref 35.1–46.3)
EGFRCR SERPLBLD CKD-EPI 2021: 66 ML/MIN/1.73M2 (ref 60–?)
EOSINOPHIL # BLD AUTO: 0.35 X10(3) UL (ref 0–0.7)
EOSINOPHIL NFR BLD AUTO: 5.2 %
ERYTHROCYTE [DISTWIDTH] IN BLOOD BY AUTOMATED COUNT: 12.3 % (ref 11–15)
EST. AVERAGE GLUCOSE BLD GHB EST-MCNC: 197 MG/DL (ref 68–126)
FASTING PATIENT LIPID ANSWER: YES
FASTING STATUS PATIENT QL REPORTED: YES
GLOBULIN PLAS-MCNC: 3.2 G/DL (ref 2.8–4.4)
GLUCOSE BLD-MCNC: 111 MG/DL (ref 70–99)
GLUCOSE UR-MCNC: >1000 MG/DL
HBA1C MFR BLD: 8.5 % (ref ?–5.7)
HCT VFR BLD AUTO: 46.7 %
HDLC SERPL-MCNC: 37 MG/DL (ref 40–59)
HGB BLD-MCNC: 15.7 G/DL
HGB UR QL STRIP.AUTO: NEGATIVE
IMM GRANULOCYTES # BLD AUTO: 0.03 X10(3) UL (ref 0–1)
IMM GRANULOCYTES NFR BLD: 0.4 %
KETONES UR-MCNC: NEGATIVE MG/DL
LDLC SERPL CALC-MCNC: 79 MG/DL (ref ?–100)
LEUKOCYTE ESTERASE UR QL STRIP.AUTO: NEGATIVE
LYMPHOCYTES # BLD AUTO: 2.48 X10(3) UL (ref 1–4)
LYMPHOCYTES NFR BLD AUTO: 36.9 %
MCH RBC QN AUTO: 31 PG (ref 26–34)
MCHC RBC AUTO-ENTMCNC: 33.6 G/DL (ref 31–37)
MCV RBC AUTO: 92.1 FL
MICROALBUMIN UR-MCNC: <0.3 MG/DL
MONOCYTES # BLD AUTO: 0.66 X10(3) UL (ref 0.1–1)
MONOCYTES NFR BLD AUTO: 9.8 %
NEUTROPHILS # BLD AUTO: 3.13 X10 (3) UL (ref 1.5–7.7)
NEUTROPHILS # BLD AUTO: 3.13 X10(3) UL (ref 1.5–7.7)
NEUTROPHILS NFR BLD AUTO: 46.7 %
NITRITE UR QL STRIP.AUTO: NEGATIVE
NONHDLC SERPL-MCNC: 94 MG/DL (ref ?–130)
OSMOLALITY SERPL CALC.SUM OF ELEC: 287 MOSM/KG (ref 275–295)
P AXIS: 43 DEGREES
P-R INTERVAL: 136 MS
PH UR: 5.5 [PH] (ref 5–8)
PLATELET # BLD AUTO: 218 10(3)UL (ref 150–450)
POTASSIUM SERPL-SCNC: 4.4 MMOL/L (ref 3.5–5.1)
PROT SERPL-MCNC: 7.9 G/DL (ref 5.7–8.2)
PROT UR-MCNC: NEGATIVE MG/DL
Q-T INTERVAL: 386 MS
QRS DURATION: 84 MS
QTC CALCULATION (BEZET): 422 MS
R AXIS: 21 DEGREES
RBC # BLD AUTO: 5.07 X10(6)UL
SODIUM SERPL-SCNC: 137 MMOL/L (ref 136–145)
SP GR UR STRIP: 1.02 (ref 1–1.03)
T AXIS: 43 DEGREES
TRIGL SERPL-MCNC: 75 MG/DL (ref 30–149)
TSI SER-ACNC: 1.89 MIU/ML (ref 0.55–4.78)
UROBILINOGEN UR STRIP-ACNC: NORMAL
VENTRICULAR RATE: 72 BPM
VLDLC SERPL CALC-MCNC: 12 MG/DL (ref 0–30)
WBC # BLD AUTO: 6.7 X10(3) UL (ref 4–11)

## 2024-04-08 PROCEDURE — 83036 HEMOGLOBIN GLYCOSYLATED A1C: CPT

## 2024-04-08 PROCEDURE — 81003 URINALYSIS AUTO W/O SCOPE: CPT

## 2024-04-08 PROCEDURE — 93005 ELECTROCARDIOGRAM TRACING: CPT

## 2024-04-08 PROCEDURE — 82043 UR ALBUMIN QUANTITATIVE: CPT

## 2024-04-08 PROCEDURE — 80053 COMPREHEN METABOLIC PANEL: CPT

## 2024-04-08 PROCEDURE — 80061 LIPID PANEL: CPT

## 2024-04-08 PROCEDURE — 82570 ASSAY OF URINE CREATININE: CPT

## 2024-04-08 PROCEDURE — 36415 COLL VENOUS BLD VENIPUNCTURE: CPT

## 2024-04-08 PROCEDURE — 93010 ELECTROCARDIOGRAM REPORT: CPT | Performed by: INTERNAL MEDICINE

## 2024-04-08 PROCEDURE — 84443 ASSAY THYROID STIM HORMONE: CPT

## 2024-04-08 PROCEDURE — 85025 COMPLETE CBC W/AUTO DIFF WBC: CPT

## 2024-04-09 DIAGNOSIS — R73.01 IFG (IMPAIRED FASTING GLUCOSE): Primary | ICD-10-CM

## 2024-04-12 RX ORDER — INSULIN GLARGINE-YFGN 100 [IU]/ML
30 INJECTION, SOLUTION SUBCUTANEOUS DAILY
Qty: 9 ML | Refills: 0 | Status: SHIPPED | OUTPATIENT
Start: 2024-04-12

## 2024-04-12 NOTE — TELEPHONE ENCOUNTER
Endocrine refill protocol for basal insulins     Protocol Criteria:  - Appointment with Endocrinology completed in the last 6 months or scheduled in the next 3 months    - A1c result completed in the last 6 months and is <8.5%     Verify appointment has been completed or scheduled in the appropriate timeline. If so can send a 90 day supply with 1 refill per provider protocol.    Verify A1c has been completed within the last 6 months and is below 8.5%     Last completed office visit:02/15/24  Next scheduled Follow up: no f/u  Last A1c result:  8.5%

## 2024-04-20 ENCOUNTER — HOSPITAL ENCOUNTER (OUTPATIENT)
Dept: ULTRASOUND IMAGING | Age: 49
Discharge: HOME | End: 2024-04-20
Attending: INTERNAL MEDICINE
Payer: COMMERCIAL

## 2024-04-20 DIAGNOSIS — R10.13 EPIGASTRIC PAIN: ICD-10-CM

## 2024-04-20 PROCEDURE — 76700 US EXAM ABDOM COMPLETE: CPT | Performed by: INTERNAL MEDICINE

## 2024-04-22 ENCOUNTER — TELEPHONE (OUTPATIENT)
Facility: LOCATION | Age: 49
End: 2024-04-22

## 2024-04-22 NOTE — TELEPHONE ENCOUNTER
Called patient to notify him of Dr Diallo's new office location as of May 13th. He has an appointment on 5/28/24.    Patient is aware and had no further questions at this time.

## 2024-04-23 ENCOUNTER — TELEPHONE (OUTPATIENT)
Facility: CLINIC | Age: 49
End: 2024-04-23

## 2024-04-23 NOTE — TELEPHONE ENCOUNTER
Spoke to patient and confirmed appt. Location/date/time details provided.     Your appointments       Date & Time Appointment Department (Center)    May 03, 2024 10:20 AM CDT Consult with TAMAR Zaidi MD [facility] ([facility], located [facility])

## 2024-04-23 NOTE — TELEPHONE ENCOUNTER
Patient is having epigastric pain and blood in stool and missed the appointment on 4/22 with KWAME Melchor please call also refer to TE 4/4

## 2024-05-03 ENCOUNTER — OFFICE VISIT (OUTPATIENT)
Facility: CLINIC | Age: 49
End: 2024-05-03
Payer: COMMERCIAL

## 2024-05-03 VITALS
HEIGHT: 73 IN | SYSTOLIC BLOOD PRESSURE: 119 MMHG | WEIGHT: 253 LBS | HEART RATE: 76 BPM | BODY MASS INDEX: 33.53 KG/M2 | DIASTOLIC BLOOD PRESSURE: 68 MMHG

## 2024-05-03 DIAGNOSIS — K62.5 RECTAL BLEEDING: ICD-10-CM

## 2024-05-03 DIAGNOSIS — E11.69 TYPE 2 DIABETES MELLITUS WITH OTHER SPECIFIED COMPLICATION, WITH LONG-TERM CURRENT USE OF INSULIN (HCC): ICD-10-CM

## 2024-05-03 DIAGNOSIS — Z79.4 TYPE 2 DIABETES MELLITUS WITH OTHER SPECIFIED COMPLICATION, WITH LONG-TERM CURRENT USE OF INSULIN (HCC): ICD-10-CM

## 2024-05-03 DIAGNOSIS — R11.0 NAUSEA: Primary | ICD-10-CM

## 2024-05-03 DIAGNOSIS — R14.0 ABDOMINAL BLOATING: ICD-10-CM

## 2024-05-03 PROCEDURE — 99204 OFFICE O/P NEW MOD 45 MIN: CPT | Performed by: INTERNAL MEDICINE

## 2024-05-03 NOTE — H&P
[facility] - Gastroenterology                                                                                                               Reason for consult: epigastric pain    Requesting physician or provider: Aureliano Romero MD    Chief Complaint   Patient presents with    Consult     Bloating;        HPI:   Nicolle Bagley is a 49 year old year-old male with history of DM, who presents for epigastric pain, bloating and rectal bleeding.    -last few months noted ab pain discomfort  -nausea, bloating sensation  -no vomiting but feels full a lot  -he has significant DM which he is working on controlling  -saw [facility] last year  -no odynophagia, no dysphagia   -no cp/sob  -no melena      CT AP (Oct 2023)  Sequela of intervening repair of anterior midline abdominal wall hernias, with   residual subcutaneous nodularity left of the midline in the epigastrium, likely   scarring. No suggestion of recurrent herniation. Increasing hepatomegaly, stable diffuse hepatic steatosis.         US ABDOMEN:  -no acute findings    Prior endoscopies:  EGD in 2023  CLN in 2023 - 1 polyp removed at [facility]    >>PATH:   -duodenum wnl  -gastric shows chronic gastritis, neg Hpylori  -colon polyp transverse (tubular adenoma) - he says he was told to repeat in 5 years    Soc:  -no smoking  -no Etoh    Wt Readings from Last 6 Encounters:   05/03/24 253 lb (114.8 kg)   04/01/24 254 lb (115.2 kg)   03/06/24 255 lb (115.7 kg)   02/21/24 259 lb (117.5 kg)   02/15/24 252 lb (114.3 kg)   02/02/24 251 lb (113.9 kg)        History, Medications, Allergies, ROS:      Past Medical History:    Diabetes (HCC)    Essential hypertension      No past surgical history on file.   Family Hx: No family history on file.   Social History:   Social History     Socioeconomic History    Marital status:    Tobacco Use    Smoking status: Never    Smokeless tobacco:  Never   Substance and Sexual Activity    Alcohol use: Not Currently    Drug use: Not Currently     Social Determinants of Health      Received from [facility]        Medications (Active prior to today's visit):  Current Outpatient Medications   Medication Sig Dispense Refill    Insulin Glargine-yfgn (SEMGLEE, YFGN,) 100 UNIT/ML Subcutaneous Solution Pen-injector Inject 30 Units/day into the skin daily. 9 mL 0    pantoprazole 40 MG Oral Tab EC Take 1 tablet (40 mg total) by mouth before breakfast. 30 tablet 3    Ciclopirox 8 % External Solution Apply 1-2 drops nightly to affected toenails 19.8 each 2    insulin glargine 100 UNIT/ML Subcutaneous Solution Inject 30 Units into the skin nightly. 9 mL 0    Pediatric Multiple Vit-C-FA (MULTIPLE VITAMINS OR) Take by mouth daily.      metFORMIN  MG Oral Tablet 24 Hr Take 2 tablets (1,000 mg total) by mouth 2 (two) times daily with meals. 120 tablet 1    Insulin Lispro (HUMALOG KWIKPEN) 200 UNIT/ML Subcutaneous Solution Pen-injector Inject 10 Units into the skin 3 (three) times daily before meals. 45 each 0    Continuous Blood Gluc Sensor (DEXCOM G7 SENSOR) Does not apply Misc 1 each Every 10 days. Use as directed every 10 days 3 each 5    hydroCHLOROthiazide 25 MG Oral Tab Take 1 tablet (25 mg total) by mouth every morning.      atorvastatin 10 MG Oral Tab Take 1 tablet (10 mg total) by mouth nightly.      dapagliflozin 10 MG Oral Tab Take 1 tablet (10 mg total) by mouth daily. 30 tablet 2    lisinopril 10 MG Oral Tab Take 1 tablet (10 mg total) by mouth daily.         Allergies:  No Known Allergies    ROS:   CONSTITUTIONAL:  negative for fevers, rigors  EYES:  negative for diplopia   RESPIRATORY:  negative for severe shortness of breath  CARDIOVASCULAR:  negative for crushing sub-sternal chest pain  GASTROINTESTINAL:  see HPI  GENITOURINARY:  negative for dysuria or gross hematuria  INTEGUMENT/BREAST:  SKIN:  negative for jaundice    ALLERGIC/IMMUNOLOGIC:  negative for hay fever  ENDOCRINE:  negative for cold intolerance and heat intolerance  MUSCULOSKELETAL:  negative for joint effusion/severe erythema  BEHAVIOR/PSYCH:  negative for psychotic behavior      PHYSICAL EXAM:   Blood pressure 119/68, pulse 76, height 6' 1\" (1.854 m), weight 253 lb (114.8 kg).    Gen- Patient appears comfortable and in no acute discomfort  HEENT: the sclera appears anicteric, oropharynx clear, mucus membranes appear moist  CV- regular rate and rhythm, the extremities are warm and well perfused   Lung- Moves air well; No labored breathing  Abdomen- soft, non-tender exam in all quadrants without rigidity or guarding, non-distended, no abnormal bowel sounds noted, no masses are palpated  Skin- No jaundice  Ext: no cyanosis, clubbing or edema is evident.   Neuro- Alert and interactive, and gross movements of extremities normal  Psych - appropriate, non-agitated    Labs/Imaging:     Patient's pertinent labs and imaging were reviewed and discussed with patient today.      ASSESSMENT/PLAN:   Nicolle Bagley is a 49 year old year-old male with history of DM, who presents for epigastric pain, bloating and rectal bleeding.    #Bloating  #Fullness  #Rectal bleeding  #Diabetes - A1C    >>Reviewed CT AP, EGD and CLN at [place] last year showing no concenring findings. HOwever, he is diabetic and I am strongly suspicious for gastroparesis which we discussed in detail. Plan for GES scan for further evaluation. Tight DM control he understands.    >>with regards to rectal bleeding, he had a c-scope in 2023 and told to repeat in 2028. Likely hemorrhoidal based on c-scope and reported hx. Avoid straining and soften stool with fiber.    Recommendations:    Gastric emptying scan  Continue pantoprazole  Fiber supplement as needed to avoid constipation  No ibuprofen type medication      Copy of this note sent to Dr. Romero.     Orders This Visit:  No orders of the defined types were placed  in this encounter.      Meds This Visit:  Requested Prescriptions      No prescriptions requested or ordered in this encounter       Imaging & Referrals:  [facility] GASTRIC EMPTYING STUDY  (CPT=78264)         DEQUAN Zaidi MD  Pager: [phone]  5/3/2024        This note was partially prepared using Dragon Medical voice recognition dictation software. As a result, errors may occur. When identified, these errors have been corrected. While every attempt is made to correct errors during dictation, discrepancies may still exist.

## 2024-05-03 NOTE — PATIENT INSTRUCTIONS
Gastric emptying scan  Continue pantoprazole  Fiber supplement as needed to avoid constipation  No ibuprofen type medication

## 2024-05-06 PROBLEM — R11.0 NAUSEA: Status: ACTIVE | Noted: 2024-05-06

## 2024-05-06 PROBLEM — K62.5 RECTAL BLEEDING: Status: ACTIVE | Noted: 2024-05-06

## 2024-05-06 PROBLEM — E11.9 DIABETES MELLITUS (HCC): Status: ACTIVE | Noted: 2024-05-06

## 2024-05-06 PROBLEM — R14.0 ABDOMINAL BLOATING: Status: ACTIVE | Noted: 2024-05-06

## 2024-05-10 RX ORDER — DAPAGLIFLOZIN 10 MG/1
10 TABLET, FILM COATED ORAL DAILY
Qty: 90 TABLET | Refills: 3 | Status: SHIPPED | OUTPATIENT
Start: 2024-05-10

## 2024-05-10 NOTE — TELEPHONE ENCOUNTER
Please review; protocol failed/no protocol.     Requested Prescriptions   Pending Prescriptions Disp Refills    FARXIGA 10 MG Oral Tab [Pharmacy Med Name: FARXIGA 10 MG TABLET] 30 tablet 2     Sig: TAKE 1 TABLET BY MOUTH EVERY DAY       Diabetes Medication Protocol Failed - 5/9/2024 12:30 AM        Failed - Last A1C < 7.5 and within past 6 months     Lab Results   Component Value Date    A1C 8.5 (H) 04/08/2024             Passed - In person appointment or virtual visit in the past 6 mos or appointment in next 3 mos     Recent Outpatient Visits              6 days ago Nausea    [facility] TAMAR Zaidi MD    Office Visit    1 month ago Annual physical exam    [facility], [place] Agr[place] B, [place]    Office Visit    2 months ago External hemorrhoid, bleeding    [facility], Son[place] Pardo Agron B, MD    Office Visit    2 months ago Type 2 diabetes mellitus without complication, unspecified whether long term insulin use (HCC)    [facility], [address] Truman Diallo DPM    Office Visit    2 months ago CKD (chronic kidney disease) stage 2, GFR 60-89 ml/min    [facility], [facility] Donato Melendez MD    Office Visit          Future Appointments         Provider Department Appt Notes    In 2 weeks Truman Diallo DPM [facility], [address] [place] [address] DM foot care    In 4 weeks [address] Nuclear Medicine     In 1 month Bandar Cao MD [facility] diabetic ee                    Passed - Microalbumin procedure in past 12 months or taking ACE/ARB        Passed - EGFRCR or GFRAA > 50     GFR Evaluation  EGFRCR: 66 , resulted on 4/8/2024          Passed - GFR in the past 12 months              Recent Outpatient Visits              6 days ago Nausea    [facility] TAMAR Zaidi MD    Office Visit    1 month ago Annual physical exam    [facility] [place]    Office Visit    2 months ago External hemorrhoid, bleeding    [facility], Sonny Pardo Agron B, MD    Office Visit    2 months ago Type 2 diabetes mellitus without complication, unspecified whether long term insulin use (HCC)    [facility], [address], [place] Truman Diallo DPM    Office Visit    2 months ago CKD (chronic kidney disease) stage 2, GFR 60-89 ml/min    [facility], [facility] Donato Melendez MD    Office Visit             Future Appointments         Provider Department Appt Notes    In 2 weeks Truman Diallo DPM [facility], [address] [place] [address] DM foot care    In 4 weeks [address] Nuclear Medicine     In 1 month Bandar Cao MD [facility] diabetic ee

## 2024-06-05 ENCOUNTER — TELEPHONE (OUTPATIENT)
Dept: ENDOCRINOLOGY CLINIC | Facility: CLINIC | Age: 49
End: 2024-06-05

## 2024-06-05 NOTE — TELEPHONE ENCOUNTER
Patient was scheduled for diabetes education today. Patient failed to show. This Educator called patient to follow up.No Answer, LVM for patient to call the diabetes learning center for assistance with scheduling a future appointment.

## 2024-06-06 ENCOUNTER — HOSPITAL ENCOUNTER (OUTPATIENT)
Dept: NUCLEAR MEDICINE | Facility: HOSPITAL | Age: 49
Discharge: HOME | End: 2024-06-06
Attending: INTERNAL MEDICINE
Payer: COMMERCIAL

## 2024-06-06 DIAGNOSIS — E11.69 TYPE 2 DIABETES MELLITUS WITH OTHER SPECIFIED COMPLICATION, WITH LONG-TERM CURRENT USE OF INSULIN (HCC): ICD-10-CM

## 2024-06-06 DIAGNOSIS — Z79.4 TYPE 2 DIABETES MELLITUS WITH OTHER SPECIFIED COMPLICATION, WITH LONG-TERM CURRENT USE OF INSULIN (HCC): ICD-10-CM

## 2024-06-06 DIAGNOSIS — R11.0 NAUSEA: ICD-10-CM

## 2024-06-06 PROCEDURE — 78264 GASTRIC EMPTYING IMG STUDY: CPT | Performed by: INTERNAL MEDICINE

## 2024-06-08 NOTE — TELEPHONE ENCOUNTER
Endocrine Refill protocol for oral and injectable diabetic medications    Protocol Criteria:  PASSED    -Appointment with Endocrinology completed in the last 6 months or scheduled in the next 3 months    -A1c result <8.5% in the past 6 months      Verify the above has been completed or scheduled in the appropriate timeline. If so can send a 90 day supply with 1 refill.     Last completed office visit: 2/15/2024   Next scheduled Follow up: no f/u scheduled        Last A1c result: Last A1c value was 8.5% done 4/8/2024.      90 DAY + 1 REFILL pending

## 2024-06-09 RX ORDER — INSULIN GLARGINE-YFGN 100 [IU]/ML
INJECTION, SOLUTION SUBCUTANEOUS
Qty: 27 ML | Refills: 1 | Status: SHIPPED | OUTPATIENT
Start: 2024-06-09

## 2024-06-10 ENCOUNTER — NURSE TRIAGE (OUTPATIENT)
Dept: INTERNAL MEDICINE CLINIC | Facility: CLINIC | Age: 49
End: 2024-06-10

## 2024-06-10 ENCOUNTER — TELEPHONE (OUTPATIENT)
Dept: ENDOCRINOLOGY CLINIC | Facility: CLINIC | Age: 49
End: 2024-06-10

## 2024-06-10 DIAGNOSIS — R39.9 URINARY TRACT INFECTION SYMPTOMS: Primary | ICD-10-CM

## 2024-06-10 RX ORDER — INSULIN LISPRO 200 [IU]/ML
10 INJECTION, SOLUTION SUBCUTANEOUS 3 TIMES DAILY
Qty: 27 ML | Refills: 1 | Status: SHIPPED | OUTPATIENT
Start: 2024-06-10

## 2024-06-10 RX ORDER — CIPROFLOXACIN 500 MG/1
500 TABLET, FILM COATED ORAL 2 TIMES DAILY
Qty: 10 TABLET | Refills: 0 | Status: SHIPPED | OUTPATIENT
Start: 2024-06-10 | End: 2024-06-15

## 2024-06-10 NOTE — TELEPHONE ENCOUNTER
Spoke with pharmacy. Semglee is $189 for 3 month supply. Insurance was different last time it was filled and he only got 1 box for $79.     Spoke with patient and he understands. He will  Semglee today.

## 2024-06-10 NOTE — TELEPHONE ENCOUNTER
Patient requesting refills Humalog.  Please call - patient is out of prescription.  Thank you.

## 2024-06-10 NOTE — PROGRESS NOTES
Chevy REZA,    Your gastric emptying scan was reviewed. Your stomach emptying time is NORMAL. No delay was noted.    Dr. Zaidi

## 2024-06-10 NOTE — TELEPHONE ENCOUNTER
Patient asking why [facility] now charging him $183 verses previously $90 for Semglee. Please contact patient at [phone],thanks.  *Patient is out of medication and asking if insurance will approve

## 2024-06-10 NOTE — TELEPHONE ENCOUNTER
patient stated that you had inform him that  Farxiga side effect might be a UTI. Patient stated that for the last couple of days he has pain before urinating and then has a burning  sensation when urinating. Patient denied visible blood,no back pain and no fever. Patient will like to know if you can give him a abx or do you need to see him. Patient is not able to make it in today.  Please advise  Reason for Disposition   All other males with painful urination, or patient wants to be seen    Protocols used: Urination Pain - Male-A-OH

## 2024-06-10 NOTE — TELEPHONE ENCOUNTER
Patient notified, verbalized understanding.     Urine order. Pended for review.     Dr. Romero: no antibiotic sent, did you want to wait until urine was submitted or just advise patient not to start until after (this was completed)

## 2024-06-10 NOTE — TELEPHONE ENCOUNTER
Will send an ab but [pt should alos go give urine at the labs the order will be placed , any worsening symptoms, like fever chills flank pain should let us know or go to er

## 2024-06-10 NOTE — TELEPHONE ENCOUNTER
Endocrine refill protocol for rapid acting, regular, intermediate, and mixed insulin:      Protocol Criteria: PASS  Appointment with Endocrinology completed in the last 6 months or scheduled in the next 3 months     Verify appointment has been completed or scheduled in the appropriate timeline. If so can send a 90 day supply with 1 refill.   Verify A1C has been completed in the last 6 months and is <8.5%    -May substitute prescriptions for Novolog and Humalog unless documented allergy (pens and vials) at the same dose and concentration per insurance preference and provider protocol.   -May substitute prescriptions for Novolin R and Humulin R unless documented allergy (pens and vials) at the same dose and concentration per insurance preference and provider protocol.   -May substitute prescriptions for Novolin N and Humulin N unless documented allergy (pens and vials) at the same dose and concentration per insurance preference and provider protocol.   -May substitute prescriptions for Humulin and Novolin 70/30 insulin unless documented allergy at the same dose and concentration per insurance preference and provider protocol.        Last completed office visit: 02/15/2024  Next scheduled Follow up: 06/18/2024  Future Appointments   Date Time Provider Department Center   7/3/2024  3:00 PM Bandar Cao MD [id number]      Last A1C result: 8.5% done 4/8/2024.     90 day + 1 refill pending

## 2024-06-11 NOTE — TELEPHONE ENCOUNTER
Noted  sent the antibiotic and order the urine test.   Patient was called and he stated that he picked up the antibiotic today but has not started taking it. Patient will go to the lab tomorrow to give us a urine sample and he will then start the abx. Today patient feels less pain on urinating but he is pushign fluids.

## 2024-06-12 ENCOUNTER — LAB ENCOUNTER (OUTPATIENT)
Dept: LAB | Facility: HOSPITAL | Age: 49
End: 2024-06-12
Attending: INTERNAL MEDICINE
Payer: COMMERCIAL

## 2024-06-12 LAB
BILIRUB UR QL: NEGATIVE
CLARITY UR: CLEAR
GLUCOSE UR-MCNC: >1000 MG/DL
HGB UR QL STRIP.AUTO: NEGATIVE
KETONES UR-MCNC: NEGATIVE MG/DL
LEUKOCYTE ESTERASE UR QL STRIP.AUTO: NEGATIVE
NITRITE UR QL STRIP.AUTO: NEGATIVE
PH UR: 6 [PH] (ref 5–8)
PROT UR-MCNC: NEGATIVE MG/DL
SP GR UR STRIP: 1.03 (ref 1–1.03)
UROBILINOGEN UR STRIP-ACNC: NORMAL

## 2024-06-12 PROCEDURE — 81003 URINALYSIS AUTO W/O SCOPE: CPT | Performed by: INTERNAL MEDICINE

## 2024-06-19 ENCOUNTER — TELEPHONE (OUTPATIENT)
Facility: CLINIC | Age: 49
End: 2024-06-19

## 2024-06-19 ENCOUNTER — NURSE TRIAGE (OUTPATIENT)
Dept: INTERNAL MEDICINE CLINIC | Facility: CLINIC | Age: 49
End: 2024-06-19

## 2024-06-19 NOTE — TELEPHONE ENCOUNTER
Dr. Zaidi,    Per Dr. Romero, patient has had no change in symptoms since he saw you in office on 5/3/24. States patient is still having the same symptoms. Bloating, epigastric pain, fullness.    Do you need to see him again in office?

## 2024-06-19 NOTE — TELEPHONE ENCOUNTER
Please reply to pool: EM RN TRIAGE  Action Requested: Summary for Provider     []  Critical Lab, Recommendations Needed  [x] Need Additional Advice  []   FYI    []   Need Orders  [] Need Medications Sent to Pharmacy  []  Other     SUMMARY: Patient contacts clinic reporting no improvement in epigastric symptoms and chest tightness.  Feels bloated after eating and drinking.  Denies shortness of breath, dizziness, lightheadedness, nausea or vomiting.  Seen by GI last month.  Gastric emptying study was negative.  He saw his cardiologist last week who does not this symptoms are cardiac related.  He was advised that he may need to see GI again.  He inquires on next steps.  Seen by Dr. Romero 04/01.  Please advise.     Reason for call: Chest Pain  Onset: Data Unavailable                       Reason for Disposition   Patient says chest pain feels exactly the same as previously diagnosed 'heartburn' and describes burning in chest and accompanying sour taste in mouth    Protocols used: Chest Pain-A-OH

## 2024-06-20 RX ORDER — AMOXICILLIN AND CLAVULANATE POTASSIUM 875; 125 MG/1; MG/1
1 TABLET, FILM COATED ORAL 2 TIMES DAILY
Qty: 20 TABLET | Refills: 0 | Status: SHIPPED | OUTPATIENT
Start: 2024-06-20 | End: 2024-06-30

## 2024-06-20 NOTE — TELEPHONE ENCOUNTER
D/w patient:    -still has bloating issues  -some dyspepsia/ ab pain  -possibly SIBO or functional bloating    Plan:  -trial of augmentin x 10 days - risks/benefits explained  -if no improvement can trial FDgard

## 2024-06-24 ENCOUNTER — TELEPHONE (OUTPATIENT)
Facility: CLINIC | Age: 49
End: 2024-06-24

## 2024-06-24 DIAGNOSIS — K62.5 RECTAL BLEEDING: Primary | ICD-10-CM

## 2024-06-24 NOTE — TELEPHONE ENCOUNTER
Patient states that he is concerned about having small drops of blood after he passes gas. Patient states that he has question for Dr Zaidi. Patient wants to know if he should have a CT Scan done?

## 2024-06-24 NOTE — TELEPHONE ENCOUNTER
Dr Zaidi    Called and spoke to the patient, date of birth and name verified.    He reported this past weekend he had 2 episodes of rectal bleeding, he noted a few drops of bright red blood after passing gas associated with mid abdominal pain (around umbilicus). He describes pain is more achy than cramping but resolves after a bowel movement or passing gas.    He is complaining of rectal discomfort. He denies dizziness, shortness of breath or weakness.    Advised to use over the counter Prep H cream, avoid spicy/greasy foods, avoid straining or sitting too long in the toilet and if experiencing dizziness or severely bleeding, he should seek ER evaluation.    He feels his symptoms is not related to hemorrhoids.  Please advise.    Thank you

## 2024-06-24 NOTE — TELEPHONE ENCOUNTER
D/w patient:    -sounds like benign hemorrhoidal bleeding  -had c-scope in 2023, told to repeat in 2028  -plan for labs, if abnormal can consider CT AP

## 2024-07-18 ENCOUNTER — TELEPHONE (OUTPATIENT)
Facility: CLINIC | Age: 49
End: 2024-07-18

## 2024-07-18 NOTE — TELEPHONE ENCOUNTER
Abx helped for bloating for about a week but sx of bloating came back.    REviewed EGD, CLN, CT ap, ultrasound results -- no acute findings.    Plan:  -FDgard for functional dyspepsia  -if not helpful will start low FODMAP diet        GI RN Staff:   Please mychart patient low FODMAP diet

## 2024-07-18 NOTE — TELEPHONE ENCOUNTER
Dr. Zaidi,    I spoke to patient, states he finished Augmentin on July 2nd. Still having bloating, abdominal/back pressure/fullness.    Asking what next steps are, states you would discuss with him what he should try next.    Please advise, thank you.

## 2024-07-18 NOTE — TELEPHONE ENCOUNTER
Patient called to speak with Dr. Zaidi's nurse in regards to what Dr. Zaidi wants the patient to do next. Patient states he finished the antibiotics. Please call.     See telephone encounter 6/19 and 6/24.

## 2024-07-24 ENCOUNTER — TELEPHONE (OUTPATIENT)
Facility: CLINIC | Age: 49
End: 2024-07-24

## 2024-07-24 NOTE — TELEPHONE ENCOUNTER
[facility]st Rem[facility]:     CBC With Differential With Platelet (Order #[id number]) on 6/24/24     Comp Metabolic Panel (14) (Order #[id number]) on 6/24/24

## 2024-07-25 ENCOUNTER — LAB ENCOUNTER (OUTPATIENT)
Dept: LAB | Facility: HOSPITAL | Age: 49
End: 2024-07-25
Attending: INTERNAL MEDICINE
Payer: COMMERCIAL

## 2024-07-25 DIAGNOSIS — R73.01 IFG (IMPAIRED FASTING GLUCOSE): ICD-10-CM

## 2024-07-25 DIAGNOSIS — K62.5 RECTAL BLEEDING: ICD-10-CM

## 2024-07-25 DIAGNOSIS — N18.2 CKD (CHRONIC KIDNEY DISEASE) STAGE 2, GFR 60-89 ML/MIN: ICD-10-CM

## 2024-07-25 LAB
ALBUMIN SERPL-MCNC: 4.9 G/DL (ref 3.2–4.8)
ALBUMIN/GLOB SERPL: 1.5 {RATIO} (ref 1–2)
ALP LIVER SERPL-CCNC: 43 U/L
ALT SERPL-CCNC: 35 U/L
ANION GAP SERPL CALC-SCNC: 7 MMOL/L (ref 0–18)
AST SERPL-CCNC: 38 U/L (ref ?–34)
BASOPHILS # BLD AUTO: 0.05 X10(3) UL (ref 0–0.2)
BASOPHILS NFR BLD AUTO: 0.8 %
BILIRUB SERPL-MCNC: 1.8 MG/DL (ref 0.3–1.2)
BUN BLD-MCNC: 15 MG/DL (ref 9–23)
BUN/CREAT SERPL: 12.3 (ref 10–20)
CALCIUM BLD-MCNC: 9.9 MG/DL (ref 8.7–10.4)
CHLORIDE SERPL-SCNC: 104 MMOL/L (ref 98–112)
CO2 SERPL-SCNC: 28 MMOL/L (ref 21–32)
CREAT BLD-MCNC: 1.22 MG/DL
DEPRECATED RDW RBC AUTO: 43.5 FL (ref 35.1–46.3)
EGFRCR SERPLBLD CKD-EPI 2021: 73 ML/MIN/1.73M2 (ref 60–?)
EOSINOPHIL # BLD AUTO: 0.19 X10(3) UL (ref 0–0.7)
EOSINOPHIL NFR BLD AUTO: 2.9 %
ERYTHROCYTE [DISTWIDTH] IN BLOOD BY AUTOMATED COUNT: 13.4 % (ref 11–15)
EST. AVERAGE GLUCOSE BLD GHB EST-MCNC: 157 MG/DL (ref 68–126)
FASTING STATUS PATIENT QL REPORTED: YES
GLOBULIN PLAS-MCNC: 3.2 G/DL (ref 2–3.5)
GLUCOSE BLD-MCNC: 113 MG/DL (ref 70–99)
HBA1C MFR BLD: 7.1 % (ref ?–5.7)
HCT VFR BLD AUTO: 46.6 %
HGB BLD-MCNC: 15.9 G/DL
IMM GRANULOCYTES # BLD AUTO: 0.03 X10(3) UL (ref 0–1)
IMM GRANULOCYTES NFR BLD: 0.5 %
LYMPHOCYTES # BLD AUTO: 2.27 X10(3) UL (ref 1–4)
LYMPHOCYTES NFR BLD AUTO: 34.2 %
MCH RBC QN AUTO: 30.4 PG (ref 26–34)
MCHC RBC AUTO-ENTMCNC: 34.1 G/DL (ref 31–37)
MCV RBC AUTO: 89.1 FL
MONOCYTES # BLD AUTO: 0.73 X10(3) UL (ref 0.1–1)
MONOCYTES NFR BLD AUTO: 11 %
NEUTROPHILS # BLD AUTO: 3.37 X10 (3) UL (ref 1.5–7.7)
NEUTROPHILS # BLD AUTO: 3.37 X10(3) UL (ref 1.5–7.7)
NEUTROPHILS NFR BLD AUTO: 50.6 %
OSMOLALITY SERPL CALC.SUM OF ELEC: 290 MOSM/KG (ref 275–295)
PHOSPHATE SERPL-MCNC: 3.5 MG/DL (ref 2.4–5.1)
PLATELET # BLD AUTO: 217 10(3)UL (ref 150–450)
POTASSIUM SERPL-SCNC: 4 MMOL/L (ref 3.5–5.1)
PROT SERPL-MCNC: 8.1 G/DL (ref 5.7–8.2)
RBC # BLD AUTO: 5.23 X10(6)UL
SODIUM SERPL-SCNC: 139 MMOL/L (ref 136–145)
WBC # BLD AUTO: 6.6 X10(3) UL (ref 4–11)

## 2024-07-25 PROCEDURE — 36415 COLL VENOUS BLD VENIPUNCTURE: CPT

## 2024-07-25 PROCEDURE — 83036 HEMOGLOBIN GLYCOSYLATED A1C: CPT

## 2024-07-25 PROCEDURE — 80053 COMPREHEN METABOLIC PANEL: CPT

## 2024-07-25 PROCEDURE — 84100 ASSAY OF PHOSPHORUS: CPT

## 2024-07-25 PROCEDURE — 85025 COMPLETE CBC W/AUTO DIFF WBC: CPT

## 2024-08-01 ENCOUNTER — TELEPHONE (OUTPATIENT)
Facility: CLINIC | Age: 49
End: 2024-08-01

## 2024-08-01 DIAGNOSIS — R79.89 ABNORMAL LFTS: Primary | ICD-10-CM

## 2024-08-01 NOTE — TELEPHONE ENCOUNTER
D/w patient :    LFTs mildly elevated.    Does have fatty liver on Ct in 2023.    Work on weight loss. Did some shots around July 4th party, so cut back on etoh he says he doesn't drink much anyway.    Could be mildly elevated from statin.

## 2024-09-09 ENCOUNTER — TELEPHONE (OUTPATIENT)
Facility: CLINIC | Age: 49
End: 2024-09-09

## 2024-09-09 NOTE — TELEPHONE ENCOUNTER
Dr. Zaidi,    Pt c/o intermittent abdominal pain, described as \"tightness\" that causes back and chest pain. States pain is the same as it was at office appt in May. Denies SOB, fatigue, diaphoresis, jaw or arm pain. Pt in NAD on call.      Episodes last approx 20-40 min each occurrence, don't happen everyday. 4/10 when it occurs. Pain sometimes improved with laying down. Denies N/V/C/D/fevers.      Not taking  pantoprazole because it didn't help. Please advise.  Thanks,  Charlene

## 2024-09-10 NOTE — TELEPHONE ENCOUNTER
RN called and spoke to pt. Pt scheduled for clinic appt per MD recs below. Date, time, and location verified with pt. Pt verbalized understanding.    Your Appointments      Thursday September 26, 2024 10:00 AM  Follow Up Visit with TAMAR Zaidi MD  [facility] [address]  [facility] [address]  [phone]

## 2024-09-12 ENCOUNTER — NURSE TRIAGE (OUTPATIENT)
Dept: INTERNAL MEDICINE CLINIC | Facility: CLINIC | Age: 49
End: 2024-09-12

## 2024-09-12 NOTE — TELEPHONE ENCOUNTER
Called patient in regards to message below  from Dr Duncan's ( identified name and date of birth )     Future Appointments   Date Time Provider Department Center   9/13/2024 11:00 AM Aureliano Romero MD [id number] [place] [id number]   9/26/2024 10:00 AM TAMAR Zaidi MD [id number]   10/21/2024 11:20 AM Donato Melendez MD [id number]

## 2024-09-12 NOTE — TELEPHONE ENCOUNTER
Action Requested: Summary for Provider     []  Critical Lab, Recommendations Needed  [x] Need Additional Advice  []   FYI    []   Need Orders  [] Need Medications Sent to Pharmacy  []  Other     SUMMARY:  please advise if ok to book patient on your schedule for tomorrow.      patient called wanting to make a appointment with you. Patient stated that he started to have chest discomfort for the past week the chest discomfort comes and goes and its about a 4/10 discomfort last for about 10-15 min. Patient also has left arm numbness and tingling that comes and goes and can last for about 10-15 min. Patient also stated that today he feels a slight headache. Patient denied shortness of breath,sweating or left jaw pain. Patient then also stated that he has upper back pain also for the past week. Patient was advise to go to there ER due to the chest discomfort lasting more then 5 min and due to the left arm numbness and tingling.  Patient stated that he does not want to go to the ER. If it gets worse or he develosp shortness of breath then he will call 911 or go to the ER but for now he will just like a schedule a appointment with Dr. Romero for tomorrow.  please advise if ok to book patient on your schedule for tomorrow.     Reason for call: Chest discomfort  Onset: 1 week        Reason for Disposition   Chest pain lasting longer than 5 minutes and occurred in last 3 days (72 hours) (Exception: Feels exactly the same as previously diagnosed heartburn and has accompanying sour taste in mouth.)    Protocols used: Chest Pain-A-OH

## 2024-09-13 ENCOUNTER — OFFICE VISIT (OUTPATIENT)
Dept: INTERNAL MEDICINE CLINIC | Facility: CLINIC | Age: 49
End: 2024-09-13

## 2024-09-13 VITALS
DIASTOLIC BLOOD PRESSURE: 68 MMHG | OXYGEN SATURATION: 99 % | RESPIRATION RATE: 18 BRPM | SYSTOLIC BLOOD PRESSURE: 112 MMHG | HEIGHT: 73 IN | BODY MASS INDEX: 36.84 KG/M2 | TEMPERATURE: 98 F | HEART RATE: 78 BPM | WEIGHT: 278 LBS

## 2024-09-13 DIAGNOSIS — N18.30 TYPE 2 DIABETES MELLITUS WITH STAGE 3 CHRONIC KIDNEY DISEASE, WITHOUT LONG-TERM CURRENT USE OF INSULIN, UNSPECIFIED WHETHER STAGE 3A OR 3B CKD (HCC): ICD-10-CM

## 2024-09-13 DIAGNOSIS — E11.22 TYPE 2 DIABETES MELLITUS WITH STAGE 3 CHRONIC KIDNEY DISEASE, WITHOUT LONG-TERM CURRENT USE OF INSULIN, UNSPECIFIED WHETHER STAGE 3A OR 3B CKD (HCC): ICD-10-CM

## 2024-09-13 DIAGNOSIS — I10 PRIMARY HYPERTENSION: ICD-10-CM

## 2024-09-13 DIAGNOSIS — R06.09 EXERTIONAL DYSPNEA: ICD-10-CM

## 2024-09-13 DIAGNOSIS — E78.2 MIXED HYPERLIPIDEMIA: ICD-10-CM

## 2024-09-13 DIAGNOSIS — Z28.21 REFUSED PNEUMOCOCCAL VACCINATION: ICD-10-CM

## 2024-09-13 DIAGNOSIS — R07.9 CHEST PAIN, UNSPECIFIED TYPE: ICD-10-CM

## 2024-09-13 DIAGNOSIS — R14.0 ABDOMINAL BLOATING: Primary | ICD-10-CM

## 2024-09-13 PROCEDURE — 99214 OFFICE O/P EST MOD 30 MIN: CPT | Performed by: INTERNAL MEDICINE

## 2024-09-13 RX ORDER — PANTOPRAZOLE SODIUM 40 MG/1
40 TABLET, DELAYED RELEASE ORAL
Qty: 30 TABLET | Refills: 3 | Status: SHIPPED | OUTPATIENT
Start: 2024-09-13

## 2024-09-13 NOTE — PROGRESS NOTES
Subjective:     Patient ID: Nicolle Bagley is a 49 year old male.    HPI  Patient comes in today with complaint of ongoing abdominal discomfort bloating midepigastric pain then the pain goes up to the chest gets tightness pain comes and goes patient in the past has seen GI for GI symptoms we did a gastric emptying study which was normal he was taking the PPI he thought it was not helping him so he stopped taking it.  At that time his shortness of breath with exertion  History/Other:   Review of Systems   Constitutional: Negative.    HENT: Negative.     Eyes: Negative.    Respiratory:  Positive for shortness of breath.    Cardiovascular:  Positive for chest pain.   Gastrointestinal:  Positive for abdominal distention and abdominal pain.   Genitourinary: Negative.    Musculoskeletal: Negative.    Skin: Negative.    Neurological: Negative.    Psychiatric/Behavioral: Negative.       Current Outpatient Medications   Medication Sig Dispense Refill    pantoprazole 40 MG Oral Tab EC Take 1 tablet (40 mg total) by mouth before breakfast. 30 tablet 3    Insulin Lispro (HUMALOG KWIKPEN) 200 UNIT/ML Subcutaneous Solution Pen-injector Inject 10 Units into the skin in the morning, at noon, and at bedtime. 27 mL 1    SEMGLEE, YFGN, 100 UNIT/ML Subcutaneous Solution Pen-injector INJECT 30 UNITS DAILY INTO THE SKIN 27 mL 1    dapagliflozin (FARXIGA) 10 MG Oral Tab Take 1 tablet (10 mg total) by mouth daily. 90 tablet 3    Ciclopirox 8 % External Solution Apply 1-2 drops nightly to affected toenails 19.8 each 2    insulin glargine 100 UNIT/ML Subcutaneous Solution Inject 30 Units into the skin nightly. 9 mL 0    Pediatric Multiple Vit-C-FA (MULTIPLE VITAMINS OR) Take by mouth daily.      hydroCHLOROthiazide 25 MG Oral Tab Take 1 tablet (25 mg total) by mouth every morning.      atorvastatin 10 MG Oral Tab Take 1 tablet (10 mg total) by mouth nightly.      lisinopril 10 MG Oral Tab Take 1 tablet (10 mg total) by mouth daily.       Continuous Blood Gluc Sensor (DEXCOM G7 SENSOR) Does not apply Misc 1 each Every 10 days. Use as directed every 10 days 3 each 5     Allergies:No Known Allergies    Past Medical History:    Diabetes (HCC)    Essential hypertension      No past surgical history on file.   No family history on file.   Social History:   Social History     Socioeconomic History    Marital status:    Tobacco Use    Smoking status: Never    Smokeless tobacco: Never   Substance and Sexual Activity    Alcohol use: Not Currently    Drug use: Not Currently     Social Determinants of Health      Received from [facility]        Objective:   Physical Exam  Constitutional:       Appearance: He is well-developed.   HENT:      Head: Normocephalic and atraumatic.      Right Ear: External ear normal.      Left Ear: External ear normal.      Nose: Nose normal.   Eyes:      Conjunctiva/sclera: Conjunctivae normal.      Pupils: Pupils are equal, round, and reactive to light.   Cardiovascular:      Rate and Rhythm: Normal rate and regular rhythm.      Heart sounds: Normal heart sounds.   Pulmonary:      Effort: Pulmonary effort is normal.      Breath sounds: Normal breath sounds.   Abdominal:      General: Bowel sounds are normal.      Palpations: Abdomen is soft.   Genitourinary:     Penis: Normal.       Prostate: Normal.      Rectum: Normal.   Musculoskeletal:         General: Normal range of motion.      Cervical back: Normal range of motion and neck supple.   Skin:     General: Skin is warm and dry.   Neurological:      Mental Status: He is alert and oriented to person, place, and time.      Deep Tendon Reflexes: Reflexes are normal and symmetric.         Assessment & Plan:   1. Abdominal bloating -start taking PPI watch diet follow-up with GI   2. Chest pain, unspecified type will order cardiac calcium score also recommends    3. Exertional dyspnea we will order stress test patient with a lot of risk factors we  will follow results   4. Type 2 diabetes mellitus with stage 3 chronic kidney disease, without long-term current use of insulin, unspecified whether stage 3a or 3b CKD (HCC) continue current treatment watch diet   5. Primary hypertension well-controlled continue current treatment   6. Mixed hyperlipidemia continue current treatment   Patient refused pneumonia vaccine    No orders of the defined types were placed in this encounter.      Meds This Visit:  Requested Prescriptions     Signed Prescriptions Disp Refills    pantoprazole 40 MG Oral Tab EC 30 tablet 3     Sig: Take 1 tablet (40 mg total) by mouth before breakfast.       Imaging & Referrals:  OPHTHALMOLOGY - INTERNAL  CT CALCIUM SCORING  CARD TREADMILL STRESS, ADULT (SDK=[id number])

## 2024-09-20 RX ORDER — HYDROCHLOROTHIAZIDE 12.5 MG/1
12.5 TABLET ORAL EVERY MORNING
Qty: 30 TABLET | Refills: 0 | Status: CANCELLED | OUTPATIENT
Start: 2024-09-20

## 2024-09-20 RX ORDER — HYDROCHLOROTHIAZIDE 12.5 MG/1
12.5 TABLET ORAL DAILY
COMMUNITY
Start: 2024-09-06 | End: 2024-09-20

## 2024-09-20 RX ORDER — ATORVASTATIN CALCIUM 20 MG/1
20 TABLET, FILM COATED ORAL DAILY
COMMUNITY
Start: 2024-09-08

## 2024-09-20 RX ORDER — HYDROCHLOROTHIAZIDE 12.5 MG/1
12.5 TABLET ORAL DAILY
Qty: 30 TABLET | Refills: 0 | Status: SHIPPED | OUTPATIENT
Start: 2024-09-20

## 2024-09-20 NOTE — TELEPHONE ENCOUNTER
Patient called to request a refill on below medication. Patient took his last tablet today. [facility]     Patient advised he was on 12.5mg but was not really working and will like to go back up to 25mg since he was taking 2 tablets instead of 1.       Medication Quantity Refills Start End   hydroCHLOROthiazide 25 MG Oral Tab -- -- 1/9/2023 --   Sig:   Take 1 tablet (25 mg total) by mouth every morning.     Route:   Oral     Class:   Historical     Order #:   [id number]

## 2024-09-20 NOTE — TELEPHONE ENCOUNTER
Please review.   Hydrochlorothiazide is managed by [facility] Dr. Maurice Carrion    Marked High Priority, patient states out of medication    Patient took last tab today. Can we send short supply? Current dose is 12.5mg     Requested Prescriptions   Pending Prescriptions Disp Refills    hydroCHLOROthiazide 12.5 MG Oral Tab 30 tablet 0     Sig: Take 1 tablet (12.5 mg total) by mouth every morning.       Hypertension Medications Protocol Passed - 9/20/2024  2:52 PM        Passed - CMP or BMP in past 12 months        Passed - Last BP reading less than 140/90     BP Readings from Last 1 Encounters:   09/13/24 112/68               Passed - In person appointment or virtual visit in the past 12 mos or appointment in next 3 mos     Recent Outpatient Visits              [facility]    Office Visit    4 months ago Nausea    [facility] TAMAR Zaidi MD    Office Visit    5 months ago Annual physical exam    [facility], [place] Pardo Agron B, MD    Office Visit    6 months ago External hemorrhoid, bleeding    [facility] [place]    Office Visit    6 months ago Type 2 diabetes mellitus without complication, unspecified whether long term insulin use (HCC)    [facility], [address] [place] Truman Diallo, ANDREA    Office Visit          Future Appointments         Provider Department Appt Notes    In 4 days EM CARD RM2 [facility] Cardiodiagnostics     In 6 days TAMAR Zaidi MD [facility] f/u abd pain    In 1 week [facility] [address] [place] Pt aware of $49 cost    In 1 month Donato Melendez MD [facility], [facility] 6 month follow up; pre-clinical labs ordered                     Passed - EGFRCR or GFRAA > 50     GFR Evaluation  EGFRCR: 73 , resulted on 7/25/2024             Future Appointments         Provider Department Appt Notes    In 4 days EM CARD RM2 [facility] Cardiodiagnostics     In 6 days TAMAR Zaidi MD [facility] f/u abd pain    In 1 week [facility] - [place] Pt aware of $49 cost    In 1 month Donato Melendez MD [facility], [facility] 6 month follow up; pre-clinical labs ordered          Recent Outpatient Visits              1 week ago Abdominal bloating    [facility] Aureliano Romero MD    Office Visit    4 months ago Nausea    [facility] TAMAR Zaidi MD    Office Visit    5 months ago Annual physical exam    [facility], Sonny Pardo Agron B, MD    Office Visit    6 months ago External hemorrhoid, bleeding    [facility], Son[facility] Pardo Agron B, MD    Office Visit    6 months ago Type 2 diabetes mellitus without complication, unspecified whether long term insulin use (HCC)    [facility], [address], Truman Bonilla DPM    Office Visit

## 2024-09-24 ENCOUNTER — HOSPITAL ENCOUNTER (OUTPATIENT)
Dept: CV DIAGNOSTICS | Facility: HOSPITAL | Age: 49
Discharge: HOME | End: 2024-09-24
Attending: INTERNAL MEDICINE
Payer: COMMERCIAL

## 2024-09-24 DIAGNOSIS — I10 PRIMARY HYPERTENSION: ICD-10-CM

## 2024-09-24 DIAGNOSIS — E11.22 TYPE 2 DIABETES MELLITUS WITH STAGE 3 CHRONIC KIDNEY DISEASE, WITHOUT LONG-TERM CURRENT USE OF INSULIN, UNSPECIFIED WHETHER STAGE 3A OR 3B CKD (HCC): ICD-10-CM

## 2024-09-24 DIAGNOSIS — N18.30 TYPE 2 DIABETES MELLITUS WITH STAGE 3 CHRONIC KIDNEY DISEASE, WITHOUT LONG-TERM CURRENT USE OF INSULIN, UNSPECIFIED WHETHER STAGE 3A OR 3B CKD (HCC): ICD-10-CM

## 2024-09-24 DIAGNOSIS — R06.09 EXERTIONAL DYSPNEA: ICD-10-CM

## 2024-09-24 DIAGNOSIS — R07.9 CHEST PAIN, UNSPECIFIED TYPE: ICD-10-CM

## 2024-09-24 DIAGNOSIS — E78.2 MIXED HYPERLIPIDEMIA: ICD-10-CM

## 2024-09-24 PROCEDURE — 93018 CV STRESS TEST I&R ONLY: CPT | Performed by: INTERNAL MEDICINE

## 2024-09-24 PROCEDURE — 93017 CV STRESS TEST TRACING ONLY: CPT | Performed by: INTERNAL MEDICINE

## 2024-09-24 PROCEDURE — 93016 CV STRESS TEST SUPVJ ONLY: CPT | Performed by: INTERNAL MEDICINE

## 2024-09-24 RX ORDER — PANTOPRAZOLE SODIUM 40 MG/1
40 TABLET, DELAYED RELEASE ORAL
Qty: 90 TABLET | Refills: 1 | OUTPATIENT
Start: 2024-09-24

## 2024-09-25 LAB
% OF MAX PREDICTED HR: 100 %
MAX DIASTOLIC BP: 88 MMHG
MAX HEART RATE: 160 BPM
MAX PREDICTED HEART RATE: 171 BPM
MAX SYSTOLIC BP: 164 MMHG
MAX WORK LOAD: 101

## 2024-09-26 ENCOUNTER — OFFICE VISIT (OUTPATIENT)
Facility: CLINIC | Age: 49
End: 2024-09-26

## 2024-09-26 VITALS
SYSTOLIC BLOOD PRESSURE: 116 MMHG | HEIGHT: 73 IN | HEART RATE: 80 BPM | DIASTOLIC BLOOD PRESSURE: 72 MMHG | BODY MASS INDEX: 37.17 KG/M2 | WEIGHT: 280.5 LBS

## 2024-09-26 DIAGNOSIS — R10.10 UPPER ABDOMINAL PAIN: ICD-10-CM

## 2024-09-26 DIAGNOSIS — R14.0 ABDOMINAL BLOATING: Primary | ICD-10-CM

## 2024-09-26 PROCEDURE — 99214 OFFICE O/P EST MOD 30 MIN: CPT | Performed by: INTERNAL MEDICINE

## 2024-09-26 RX ORDER — METOCLOPRAMIDE 10 MG/1
10 TABLET ORAL 3 TIMES DAILY PRN
Qty: 90 TABLET | Refills: 0 | Status: SHIPPED | OUTPATIENT
Start: 2024-09-26 | End: 2024-10-26

## 2024-09-26 NOTE — PATIENT INSTRUCTIONS
Low FODMAP diet  Reglan as needed, up to three times a day  Avoid NSAIDs like ibuprofen   Stop the pantoprazole to see if that makes any difference    IF NOT improving, can consider trial of dicyclomine as needed.

## 2024-09-26 NOTE — PROGRESS NOTES
[facility] - Gastroenterology                                                                                                      Clinic Follow-up Visit          Subjective/HPI:   Nicolle Bagley is a 49 year old year-old male with history of DM, who presents f/u of epigastric pain.    Since last visit   -gastric emptying scan normal  -augmentin for SIBO given- not helpful  -has not started low FODMAP diet  -FDgard tried  -He continues to get these periodic episodes of fullness in the upper abdomen that goes up into his chest and he says bilateral arms and has tingling this  - he did undergo cardiac workup which was thankfully normal  -No melena or hematochezia  - No chest pain or shortness of breath  - No odynophagia or dysphagia  - Sometimes this bloating fullness sensation in the upper abdomen can, even when he is not eating he says and it takes about 1520 minutes for him to feel better    Initial visit (May 2024)  -last few months noted ab pain discomfort  -nausea, bloating sensation  -no vomiting but feels full a lot  -he has significant DM which he is working on controlling  -saw [facility] last year  -no odynophagia, no dysphagia   -no cp/sob  -no melena        CT AP (Oct 2023)  Sequela of intervening repair of anterior midline abdominal wall hernias, with   residual subcutaneous nodularity left of the midline in the epigastrium, likely   scarring. No suggestion of recurrent herniation. Increasing hepatomegaly, stable diffuse hepatic steatosis.            US ABDOMEN:  -no acute findings     Prior endoscopies:  EGD in 2023  CLN in 2023 - 1 polyp removed at [facility]     >>PATH:   -duodenum wnl  -gastric shows chronic gastritis, neg Hpylori  -colon polyp transverse (tubular adenoma) - he says he was told to repeat in 5 years     Soc:  -no smoking  -no Etoh        Wt Readings from Last 6 Encounters:   09/26/24 280 lb 8 oz  (127.2 kg)   09/13/24 278 lb (126.1 kg)   05/03/24 253 lb (114.8 kg)   04/01/24 254 lb (115.2 kg)   03/06/24 255 lb (115.7 kg)   02/21/24 259 lb (117.5 kg)        History, Medications, Allergies, ROS:      Past Medical History:    Diabetes (HCC)    Essential hypertension      History reviewed. No pertinent surgical history.   History reviewed. No pertinent family history.   Social History:   Social History     Socioeconomic History    Marital status:    Tobacco Use    Smoking status: Never    Smokeless tobacco: Never   Substance and Sexual Activity    Alcohol use: Not Currently    Drug use: Not Currently     Social Determinants of Health      Received from [facility], [facility]        Medications (Active prior to today's visit):  Current Outpatient Medications   Medication Sig Dispense Refill    atorvastatin 20 MG Oral Tab Take 1 tablet (20 mg total) by mouth daily.      hydroCHLOROthiazide 12.5 MG Oral Tab Take 1 tablet (12.5 mg total) by mouth daily. 30 tablet 0    pantoprazole 40 MG Oral Tab EC Take 1 tablet (40 mg total) by mouth before breakfast. 30 tablet 3    Insulin Lispro (HUMALOG KWIKPEN) 200 UNIT/ML Subcutaneous Solution Pen-injector Inject 10 Units into the skin in the morning, at noon, and at bedtime. 27 mL 1    SEMGLEE, YFGN, 100 UNIT/ML Subcutaneous Solution Pen-injector INJECT 30 UNITS DAILY INTO THE SKIN 27 mL 1    dapagliflozin (FARXIGA) 10 MG Oral Tab Take 1 tablet (10 mg total) by mouth daily. 90 tablet 3    Ciclopirox 8 % External Solution Apply 1-2 drops nightly to affected toenails 19.8 each 2    insulin glargine 100 UNIT/ML Subcutaneous Solution Inject 30 Units into the skin nightly. 9 mL 0    Pediatric Multiple Vit-C-FA (MULTIPLE VITAMINS OR) Take by mouth daily.      Continuous Blood Gluc Sensor (DEXCOM G7 SENSOR) Does not apply Misc 1 each Every 10 days. Use as directed every 10 days 3 each 5    lisinopril 10 MG Oral Tab Take 1 tablet (10 mg total) by mouth  daily.         Allergies:  No Known Allergies    ROS:   CONSTITUTIONAL:  negative for fevers, chills  EYES:  negative for change in vision  RESPIRATORY:  negative for  shortness of breath  CARDIOVASCULAR:  negative for  chest pain  GASTROINTESTINAL:  see HPI  GENITOURINARY:  negative for dysuria  INTEGUMENT/BREAST:  SKIN:  negative for  rash  ALLERGIC/IMMUNOLOGIC:  negative for hay fever  ENDOCRINE:  negative for cold intolerance and heat intolerance  MUSCULOSKELETAL:  negative for  joint stiffness and joint swelling  BEHAVIOR/PSYCH:  negative for depressed mood    PHYSICAL EXAM:   Height 6' 1\" (1.854 m), weight 280 lb 8 oz (127.2 kg).    Gen- Patient appears comfortable and in no acute discomfort  HEENT: the sclera appears anicteric, oropharynx clear, mucus membranes appear moist  CV- regular rate and rhythm, the extremities are warm and well perfused   Lung- Moves air well; No labored breathing  Abdomen- soft, non-tender exam in all quadrants without rigidity or guarding, non-distended, no abnormal bowel sounds noted, no masses are palpated  Skin- No jaundice  Ext: no cyanosis, clubbing or edema is evident.   Neuro- Alert and oriented x4, and patient is having movement of all 4 extremities   Psych - appropriate, non-agitated    Labs/Imaging:     Patient's labs and imaging were reviewed and discussed with patient today.     .  ASSESSMENT/PLAN:   Nicolle Balgey is a 49 year old year-old male with history of DM, who presents f/u of epigastric pain.    #Fatty liver  #Bloating  #Fullness    Reviewed CT AP, EGD and CLN at [facility] last year showing no concerning findings. GES normal, no delayed emptying.  -gastric emptying scan normal  -augmentin for SIBO given - not effective  -FDgard tried      > I suspect he has some form of functional dyspepsia that seems to come and go.  He has episodes where he has fullness and bloating that can happen even when he is not eating in the last for 15 to 20 minutes and then his belly  feels quite firm he says and then it relaxes.  Not constipated, not having change in bowels.    PLAN:  Low FODMAP diet  Reglan as needed, up to three times a day  Avoid NSAIDs like ibuprofen   Stop the pantoprazole to see if that makes any difference    IF NOT improving, can consider trial of dicyclomine as needed.      Orders This Visit:  No orders of the defined types were placed in this encounter.      Meds This Visit:  Requested Prescriptions      No prescriptions requested or ordered in this encounter       Imaging & Referrals:  None     9/26/2024    DEQUAN Zaidi MD  Pager: [phone]        This note was partially prepared using Dragon Medical voice recognition dictation software. As a result, errors may occur. When identified, these errors have been corrected. While every attempt is made to correct errors during dictation, discrepancies may still exist.

## 2024-10-14 RX ORDER — HYDROCHLOROTHIAZIDE 12.5 MG/1
12.5 TABLET ORAL DAILY
Qty: 30 TABLET | Refills: 0 | Status: SHIPPED | OUTPATIENT
Start: 2024-10-14

## 2024-10-21 ENCOUNTER — LAB ENCOUNTER (OUTPATIENT)
Dept: LAB | Facility: HOSPITAL | Age: 49
End: 2024-10-21
Attending: INTERNAL MEDICINE
Payer: COMMERCIAL

## 2024-10-21 ENCOUNTER — OFFICE VISIT (OUTPATIENT)
Dept: NEPHROLOGY | Facility: CLINIC | Age: 49
End: 2024-10-21

## 2024-10-21 VITALS
HEIGHT: 73 IN | DIASTOLIC BLOOD PRESSURE: 70 MMHG | HEART RATE: 92 BPM | BODY MASS INDEX: 37.67 KG/M2 | RESPIRATION RATE: 16 BRPM | WEIGHT: 284.19 LBS | SYSTOLIC BLOOD PRESSURE: 110 MMHG

## 2024-10-21 DIAGNOSIS — N18.2 CKD (CHRONIC KIDNEY DISEASE) STAGE 2, GFR 60-89 ML/MIN: ICD-10-CM

## 2024-10-21 DIAGNOSIS — N18.2 CKD (CHRONIC KIDNEY DISEASE) STAGE 2, GFR 60-89 ML/MIN: Primary | ICD-10-CM

## 2024-10-21 LAB
ALBUMIN SERPL-MCNC: 4.9 G/DL (ref 3.2–4.8)
ANION GAP SERPL CALC-SCNC: 5 MMOL/L (ref 0–18)
BASOPHILS # BLD AUTO: 0.07 X10(3) UL (ref 0–0.2)
BASOPHILS NFR BLD AUTO: 1 %
BUN BLD-MCNC: 22 MG/DL (ref 9–23)
BUN/CREAT SERPL: 16.2 (ref 10–20)
CALCIUM BLD-MCNC: 9.9 MG/DL (ref 8.7–10.4)
CHLORIDE SERPL-SCNC: 103 MMOL/L (ref 98–112)
CO2 SERPL-SCNC: 30 MMOL/L (ref 21–32)
CREAT BLD-MCNC: 1.36 MG/DL
DEPRECATED RDW RBC AUTO: 44.5 FL (ref 35.1–46.3)
EGFRCR SERPLBLD CKD-EPI 2021: 64 ML/MIN/1.73M2 (ref 60–?)
EOSINOPHIL # BLD AUTO: 0.22 X10(3) UL (ref 0–0.7)
EOSINOPHIL NFR BLD AUTO: 3 %
ERYTHROCYTE [DISTWIDTH] IN BLOOD BY AUTOMATED COUNT: 13 % (ref 11–15)
GLUCOSE BLD-MCNC: 126 MG/DL (ref 70–99)
HCT VFR BLD AUTO: 45.4 %
HGB BLD-MCNC: 15.6 G/DL
IMM GRANULOCYTES # BLD AUTO: 0.04 X10(3) UL (ref 0–1)
IMM GRANULOCYTES NFR BLD: 0.5 %
LYMPHOCYTES # BLD AUTO: 2.43 X10(3) UL (ref 1–4)
LYMPHOCYTES NFR BLD AUTO: 33.2 %
MCH RBC QN AUTO: 32 PG (ref 26–34)
MCHC RBC AUTO-ENTMCNC: 34.4 G/DL (ref 31–37)
MCV RBC AUTO: 93.2 FL
MONOCYTES # BLD AUTO: 0.91 X10(3) UL (ref 0.1–1)
MONOCYTES NFR BLD AUTO: 12.4 %
NEUTROPHILS # BLD AUTO: 3.65 X10 (3) UL (ref 1.5–7.7)
NEUTROPHILS # BLD AUTO: 3.65 X10(3) UL (ref 1.5–7.7)
NEUTROPHILS NFR BLD AUTO: 49.9 %
OSMOLALITY SERPL CALC.SUM OF ELEC: 291 MOSM/KG (ref 275–295)
PHOSPHATE SERPL-MCNC: 4.1 MG/DL (ref 2.4–5.1)
PLATELET # BLD AUTO: 228 10(3)UL (ref 150–450)
POTASSIUM SERPL-SCNC: 4.6 MMOL/L (ref 3.5–5.1)
RBC # BLD AUTO: 4.87 X10(6)UL
SODIUM SERPL-SCNC: 138 MMOL/L (ref 136–145)
WBC # BLD AUTO: 7.3 X10(3) UL (ref 4–11)

## 2024-10-21 PROCEDURE — 99214 OFFICE O/P EST MOD 30 MIN: CPT | Performed by: INTERNAL MEDICINE

## 2024-10-21 PROCEDURE — 36415 COLL VENOUS BLD VENIPUNCTURE: CPT

## 2024-10-21 PROCEDURE — 85025 COMPLETE CBC W/AUTO DIFF WBC: CPT

## 2024-10-21 PROCEDURE — 80069 RENAL FUNCTION PANEL: CPT

## 2024-10-21 RX ORDER — ATORVASTATIN CALCIUM 20 MG/1
20 TABLET, FILM COATED ORAL DAILY
Qty: 90 TABLET | Refills: 0 | OUTPATIENT
Start: 2024-10-21

## 2024-10-21 NOTE — PATIENT INSTRUCTIONS
Keep your blood pressures and blood sugars under good control.  Repeat your kidney blood test in 3 months which will be in January 2025.  Orders are in the computer

## 2024-10-21 NOTE — TELEPHONE ENCOUNTER
Patient is requesting refill for atorvastatin 20 MG Oral Tab   Out of medication.     [facility]/pharmacy #[id number] - [place], [place] - [address] [phone], [phone]

## 2024-10-21 NOTE — PROGRESS NOTES
10/21/24        Patient: Nicolle Bagley   YOB: 1975   Date of Visit: 10/21/2024       Dear  Dr. Nick MD,      Thank you for referring Nicolle Bagley to my practice.  Please find my assessment and plan below.          As you know he is a 49-year-old male with a history of hypertension, hypercholesterolemia and adult onset diabetes mellitus who I now the pleasure of seeing for follow-up of chronic kidney disease stage II.  Overall the patient states has been doing well without any chest pain, shortness of breath, GI or urinary tract symptoms.  Checks blood pressures at home and they have been under good control.  States diabetes is also improved.  Last A1c was 7.1 in July.    On physical exam his blood pressure 110/70 with a pulse of 92 and he weighed 284 pounds.  His neck was supple without JVD.  Lungs are clear.  Heart revealed a regular rate and rhythm with an S4 but no gallops, murmurs or rubs.  Abdomen was soft, flat, nontender without organomegaly, masses or bruits.  Extremities revealed no edema.    The patient just had laboratory studies today.  Creatinine is 1.36 with an estimated GFR of 64 cc/min.  Creatinine was a bit better at 1.22 back in July 2024 but in April 2024 his creatinine was 1.33.    I therefore reassured the patient is overall his renal function is stable.  Blood pressures and blood sugars have improved.  He knows to maintain adequate hydration.  Avoid nonsteroidals.  Repeat CBC, renal panel in 3 months.  Return in 6 months.    Thank you very much for allowing me to participate in the care of your patient.  If you have any questions please were free to call.        Sincerely,   Donato Meelndez MD   [facility], [facility], [place]  [address]  [facility] [address]    Document electronically generated by:  Donato Melendez,

## 2024-10-23 RX ORDER — ATORVASTATIN CALCIUM 20 MG/1
20 TABLET, FILM COATED ORAL DAILY
Refills: 0 | OUTPATIENT
Start: 2024-10-23

## 2024-11-20 RX ORDER — HYDROCHLOROTHIAZIDE 12.5 MG/1
12.5 TABLET ORAL DAILY
Qty: 90 TABLET | Refills: 3 | Status: SHIPPED | OUTPATIENT
Start: 2024-11-20

## 2024-11-20 NOTE — TELEPHONE ENCOUNTER
Refill passed per [facility] protocol.    Requested Prescriptions   Pending Prescriptions Disp Refills    HYDROCHLOROTHIAZIDE 12.5 MG Oral Tab [Pharmacy Med Name: HYDROCHLOROTHIAZIDE 12.5 MG TB] 30 tablet 0     Sig: TAKE 1 TABLET BY MOUTH EVERY DAY       Hypertension Medications Protocol Passed - 11/20/2024  3:30 PM        Passed - CMP or BMP in past 12 months        Passed - Last BP reading less than 140/90     BP Readings from Last 1 Encounters:   10/21/24 110/70               Passed - In person appointment or virtual visit in the past 12 mos or appointment in next 3 mos     Recent Outpatient Visits              1 month ago CKD (chronic kidney disease) stage 2, GFR 60-89 ml/min    [facility] [place]    Office Visit    1 month ago Abdominal bloating    [facility], TAMAR Oliveira MD    Office Visit    2 months ago Abdominal bloating    [facility], Aureliano Owen MD    Office Visit    6 months ago Nausea    [facility], TAMAR Oliveira MD    Office Visit    7 months ago Annual physical exam    [place] [place] Aureliano Romero MD    Office Visit                      Passed - EGFRCR or GFRAA > 50     GFR Evaluation  EGFRCR: 64 , resulted on 10/21/2024                   Recent Outpatient Visits              1 month ago CKD (chronic kidney disease) stage 2, GFR 60-89 ml/min    [facility] [place] Donato Melendez MD    Office Visit    1 month ago Abdominal bloating    [facility], TAMAR Oliveira MD    Office Visit    2 months ago Abdominal bloating    [facility], Aureliano Owen MD    Office Visit    6 months ago [facility]ausea    [facility]    Office Visit    7 months ago Annual physical exam    [facility], Sonny Pardo Agron B, MD    Office Visit

## 2024-12-09 ENCOUNTER — NURSE TRIAGE (OUTPATIENT)
Dept: INTERNAL MEDICINE CLINIC | Facility: CLINIC | Age: 49
End: 2024-12-09

## 2024-12-09 NOTE — TELEPHONE ENCOUNTER
Leg pain below knee/ lateral side. Few days ago noticed it. Was severe, then it pain subsided.   Today Woke up and hurts still but not as bad.     No swelling, no injury.     Patient also has some Headaches   He checked Blood pressure had noticed various different readings 130s/90's and some readings he felt not accurate readings.     Offered appointment today but not able to make it. Appointment made tomorrow [place] office.   Reason for Disposition   MODERATE pain (e.g., symptoms interfere with work or school, limping) and present > 3 days    Protocols used: Knee Pain-A-OH

## 2024-12-10 ENCOUNTER — OFFICE VISIT (OUTPATIENT)
Dept: INTERNAL MEDICINE CLINIC | Facility: CLINIC | Age: 49
End: 2024-12-10

## 2024-12-10 VITALS
RESPIRATION RATE: 17 BRPM | HEIGHT: 73 IN | WEIGHT: 277 LBS | OXYGEN SATURATION: 98 % | HEART RATE: 73 BPM | DIASTOLIC BLOOD PRESSURE: 60 MMHG | BODY MASS INDEX: 36.71 KG/M2 | TEMPERATURE: 98 F | SYSTOLIC BLOOD PRESSURE: 126 MMHG

## 2024-12-10 DIAGNOSIS — J34.89 FRONTAL SINUS PAIN: ICD-10-CM

## 2024-12-10 DIAGNOSIS — N52.9 ERECTILE DYSFUNCTION, UNSPECIFIED ERECTILE DYSFUNCTION TYPE: ICD-10-CM

## 2024-12-10 DIAGNOSIS — M25.562 ACUTE PAIN OF LEFT KNEE: ICD-10-CM

## 2024-12-10 DIAGNOSIS — E11.22 TYPE 2 DIABETES MELLITUS WITH STAGE 3 CHRONIC KIDNEY DISEASE, WITHOUT LONG-TERM CURRENT USE OF INSULIN, UNSPECIFIED WHETHER STAGE 3A OR 3B CKD (HCC): ICD-10-CM

## 2024-12-10 DIAGNOSIS — N18.30 TYPE 2 DIABETES MELLITUS WITH STAGE 3 CHRONIC KIDNEY DISEASE, WITHOUT LONG-TERM CURRENT USE OF INSULIN, UNSPECIFIED WHETHER STAGE 3A OR 3B CKD (HCC): ICD-10-CM

## 2024-12-10 DIAGNOSIS — R68.82 DECREASED LIBIDO: ICD-10-CM

## 2024-12-10 DIAGNOSIS — I10 PRIMARY HYPERTENSION: Primary | ICD-10-CM

## 2024-12-10 PROCEDURE — 99214 OFFICE O/P EST MOD 30 MIN: CPT | Performed by: INTERNAL MEDICINE

## 2024-12-10 RX ORDER — AZITHROMYCIN 250 MG/1
TABLET, FILM COATED ORAL
Qty: 6 TABLET | Refills: 0 | Status: SHIPPED | OUTPATIENT
Start: 2024-12-10 | End: 2024-12-15

## 2024-12-10 NOTE — PROGRESS NOTES
Subjective:     Patient ID: Nicolle Bagley is a 49 year old male.    HPI  Patient comes in today with complaint of having some sinus area headache over the frontal and the eyes this is on and off for the last month.  Blood pressure has been well-controlled just checking his sugars they have been okay patient also today complains of left leg pain from the knee down the front of the leg pain with certain movement no injury  Patient also complains of erectile dysfunction and decreased libido this been going on for some time he tried medication from [facility] it was a combination of tadalafil and fluoxetine he took it 1 time and just says made him feel weird and he has not taken it since.  History/Other:   Review of Systems   Constitutional: Negative.  Negative for fatigue and fever.   HENT:  Positive for congestion, sinus pressure and sinus pain.    Eyes: Negative.    Respiratory: Negative.  Negative for cough, shortness of breath and wheezing.    Cardiovascular: Negative.  Negative for chest pain, palpitations and leg swelling.   Gastrointestinal: Negative.    Endocrine: Negative for cold intolerance and heat intolerance.   Genitourinary: Negative.  Negative for dysuria, flank pain and hematuria.        Ed, with decreased libido    Musculoskeletal: Negative.  Negative for arthralgias, back pain and myalgias.        Left knee pain    Skin: Negative.    Neurological: Negative.  Negative for dizziness, tremors, syncope, weakness and headaches.   Psychiatric/Behavioral: Negative.  Negative for agitation, behavioral problems and suicidal ideas. The patient is not nervous/anxious.      Current Outpatient Medications   Medication Sig Dispense Refill    azithromycin (ZITHROMAX Z-DANIEL) 250 MG Oral Tab Take 2 tablets (500 mg total) by mouth daily for 1 day, THEN 1 tablet (250 mg total) daily for 4 days. 6 tablet 0    hydroCHLOROthiazide 12.5 MG Oral Tab Take 1 tablet (12.5 mg total) by mouth daily. 90 tablet 3    atorvastatin 20 MG  Oral Tab Take 1 tablet (20 mg total) by mouth daily.      pantoprazole 40 MG Oral Tab EC Take 1 tablet (40 mg total) by mouth before breakfast. 30 tablet 3    Insulin Lispro (HUMALOG KWIKPEN) 200 UNIT/ML Subcutaneous Solution Pen-injector Inject 10 Units into the skin in the morning, at noon, and at bedtime. 27 mL 1    SEMGLEE, YFGN, 100 UNIT/ML Subcutaneous Solution Pen-injector INJECT 30 UNITS DAILY INTO THE SKIN 27 mL 1    dapagliflozin (FARXIGA) 10 MG Oral Tab Take 1 tablet (10 mg total) by mouth daily. 90 tablet 3    Pediatric Multiple Vit-C-FA (MULTIPLE VITAMINS OR) Take by mouth daily.      lisinopril 10 MG Oral Tab Take 1 tablet (10 mg total) by mouth daily.      Continuous Blood Gluc Sensor (DEXCOM G7 SENSOR) Does not apply Misc 1 each Every 10 days. Use as directed every 10 days 3 each 5     Allergies:Allergies[1]    Past Medical History:    Diabetes (HCC)    Essential hypertension      No past surgical history on file.   History reviewed. No pertinent family history.   Social History:   Social History     Socioeconomic History    Marital status:    Tobacco Use    Smoking status: Never     Passive exposure: Never    Smokeless tobacco: Never   Vaping Use    Vaping status: Never Used   Substance and Sexual Activity    Alcohol use: Not Currently    Drug use: Not Currently     Social Drivers of Health      Received from [facility]    [facility] [place] [facility]        Objective:   Physical Exam  Constitutional:       Appearance: He is well-developed.   HENT:      Head: Normocephalic and atraumatic.      Right Ear: External ear normal.      Left Ear: External ear normal.      Nose: Congestion present.   Eyes:      Conjunctiva/sclera: Conjunctivae normal.      Pupils: Pupils are equal, round, and reactive to light.   Cardiovascular:      Rate and Rhythm: Normal rate and regular rhythm.      Heart sounds: Normal heart sounds.   Pulmonary:      Effort: Pulmonary effort is normal.      Breath sounds: Normal  breath sounds.   Abdominal:      General: Bowel sounds are normal.      Palpations: Abdomen is soft.   Genitourinary:     Penis: Normal.       Prostate: Normal.      Rectum: Normal.   Musculoskeletal:         General: Tenderness present. Normal range of motion.      Cervical back: Normal range of motion and neck supple.      Comments: Rt knee pain    Skin:     General: Skin is warm and dry.   Neurological:      Mental Status: He is alert and oriented to person, place, and time.      Deep Tendon Reflexes: Reflexes are normal and symmetric.         Assessment & Plan:   1. Primary hypertension blood pressure controlled continue current treatment   2. Type 2 diabetes mellitus with stage 3 chronic kidney disease, without long-term current use of insulin, unspecified whether stage 3a or 3b CKD (HCC) we will retest watch diet take medication   3. Frontal sinus pain will treat with antibiotic let us know if not better   4. Decreased libido will check testosterone level we will follow results   5. Acute pain of left knee we will get an x-ray will decide if any further treatment or care as needed medication for pain ice   6. Erectile dysfunction, unspecified erectile dysfunction type as above we will get testosterone levels will follow-up       Orders Placed This Encounter   Procedures    Hemoglobin A1C    Testosterone, Total And Free [E]       Meds This Visit:  Requested Prescriptions     Signed Prescriptions Disp Refills    azithromycin (ZITHROMAX Z-DANIEL) 250 MG Oral Tab 6 tablet 0     Sig: Take 2 tablets (500 mg total) by mouth daily for 1 day, THEN 1 tablet (250 mg total) daily for 4 days.       Imaging & Referrals:  XR KNEE (3 VIEWS), LEFT (CPT=73562)            [1] No Known Allergies

## 2024-12-11 NOTE — TELEPHONE ENCOUNTER
Patient requesting refill         [facility]/pharmacy #[id number] - [place], [place] - [address]     Medication Detail    Medication Quantity Refills Start End   Insulin Lispro (HUMALOG KWIKPEN) 200 UNIT/ML Subcutaneous Solution Pen-injector 27 mL 1 6/10/2024 --   Sig:   Inject 10 Units into the skin in the morning, at noon, and at bedtime.     Route:   Subcutaneous     Order #:   [id number]

## 2024-12-16 RX ORDER — INSULIN LISPRO 200 [IU]/ML
10 INJECTION, SOLUTION SUBCUTANEOUS 3 TIMES DAILY
Qty: 27 ML | Refills: 1 | Status: SHIPPED | OUTPATIENT
Start: 2024-12-16

## 2024-12-19 ENCOUNTER — HOSPITAL ENCOUNTER (OUTPATIENT)
Dept: CT IMAGING | Age: 49
Discharge: HOME | End: 2024-12-19
Attending: INTERNAL MEDICINE

## 2024-12-19 DIAGNOSIS — N18.30 TYPE 2 DIABETES MELLITUS WITH STAGE 3 CHRONIC KIDNEY DISEASE, WITHOUT LONG-TERM CURRENT USE OF INSULIN, UNSPECIFIED WHETHER STAGE 3A OR 3B CKD (HCC): ICD-10-CM

## 2024-12-19 DIAGNOSIS — E78.2 MIXED HYPERLIPIDEMIA: ICD-10-CM

## 2024-12-19 DIAGNOSIS — R07.9 CHEST PAIN, UNSPECIFIED TYPE: ICD-10-CM

## 2024-12-19 DIAGNOSIS — E11.22 TYPE 2 DIABETES MELLITUS WITH STAGE 3 CHRONIC KIDNEY DISEASE, WITHOUT LONG-TERM CURRENT USE OF INSULIN, UNSPECIFIED WHETHER STAGE 3A OR 3B CKD (HCC): ICD-10-CM

## 2025-01-15 ENCOUNTER — TELEPHONE (OUTPATIENT)
Facility: CLINIC | Age: 50
End: 2025-01-15

## 2025-01-15 NOTE — TELEPHONE ENCOUNTER
1/15/25 1st letter sent to patient's home -    Hepatitis A B + C profile (Order #[id number]) on 8/1/24       Hepatic Function Panel (7) (Order #[id number]) on 8/1/24

## 2025-01-27 ENCOUNTER — TELEPHONE (OUTPATIENT)
Facility: CLINIC | Age: 50
End: 2025-01-27

## 2025-01-27 ENCOUNTER — TELEPHONE (OUTPATIENT)
Dept: INTERNAL MEDICINE CLINIC | Facility: CLINIC | Age: 50
End: 2025-01-27

## 2025-01-27 NOTE — TELEPHONE ENCOUNTER
Patient would like to know if the lab order back in December is still good to use or if he needs to get a new order, he is planning to do it tomorrow.   Informed that it is good for a year and he does not need to fast .   Stated understanding .

## 2025-01-27 NOTE — TELEPHONE ENCOUNTER
Patient called to speak with a nurse in regards to the letter he received from Dr. aZidi's office in regards to the labs. Please call.

## 2025-01-27 NOTE — TELEPHONE ENCOUNTER
I spoke to the patient, was not aware of labs since has not seen Dr. Zaidi in a long time.    Notified patient Dr. Zaidi placed orders for hepatitis panel and hepatic function panel on the day he spoke to him on the phone in office.    He will come in to do these labs tomorrow.

## 2025-01-29 ENCOUNTER — LAB ENCOUNTER (OUTPATIENT)
Dept: LAB | Facility: HOSPITAL | Age: 50
End: 2025-01-29
Attending: INTERNAL MEDICINE
Payer: COMMERCIAL

## 2025-01-29 DIAGNOSIS — R79.89 ABNORMAL LFTS: ICD-10-CM

## 2025-01-29 DIAGNOSIS — E11.22 TYPE 2 DIABETES MELLITUS WITH STAGE 3 CHRONIC KIDNEY DISEASE, WITHOUT LONG-TERM CURRENT USE OF INSULIN, UNSPECIFIED WHETHER STAGE 3A OR 3B CKD (HCC): ICD-10-CM

## 2025-01-29 DIAGNOSIS — J34.89 FRONTAL SINUS PAIN: ICD-10-CM

## 2025-01-29 DIAGNOSIS — I10 PRIMARY HYPERTENSION: ICD-10-CM

## 2025-01-29 DIAGNOSIS — N18.30 TYPE 2 DIABETES MELLITUS WITH STAGE 3 CHRONIC KIDNEY DISEASE, WITHOUT LONG-TERM CURRENT USE OF INSULIN, UNSPECIFIED WHETHER STAGE 3A OR 3B CKD (HCC): ICD-10-CM

## 2025-01-29 DIAGNOSIS — M25.562 ACUTE PAIN OF LEFT KNEE: ICD-10-CM

## 2025-01-29 DIAGNOSIS — R68.82 DECREASED LIBIDO: ICD-10-CM

## 2025-01-29 LAB
ALBUMIN SERPL-MCNC: 4.8 G/DL (ref 3.2–4.8)
ALP LIVER SERPL-CCNC: 48 U/L
ALT SERPL-CCNC: 43 U/L
AST SERPL-CCNC: 39 U/L (ref ?–34)
BILIRUB DIRECT SERPL-MCNC: 0.5 MG/DL (ref ?–0.3)
BILIRUB SERPL-MCNC: 1.3 MG/DL (ref 0.3–1.2)
EST. AVERAGE GLUCOSE BLD GHB EST-MCNC: 163 MG/DL (ref 68–126)
HAV AB SER QL IA: NONREACTIVE
HBA1C MFR BLD: 7.3 % (ref ?–5.7)
HBV CORE AB SERPL QL IA: NONREACTIVE
HBV SURFACE AB SER QL: NONREACTIVE
HBV SURFACE AB SERPL IA-ACNC: <3.1 MIU/ML
HBV SURFACE AG SERPL QL IA: NONREACTIVE
HCV AB SERPL QL IA: NONREACTIVE
PROT SERPL-MCNC: 8 G/DL (ref 5.7–8.2)

## 2025-01-29 PROCEDURE — 86803 HEPATITIS C AB TEST: CPT

## 2025-01-29 PROCEDURE — 80503 PATH CLIN CONSLTJ SF 5-20: CPT

## 2025-01-29 PROCEDURE — 84403 ASSAY OF TOTAL TESTOSTERONE: CPT

## 2025-01-29 PROCEDURE — 86704 HEP B CORE ANTIBODY TOTAL: CPT

## 2025-01-29 PROCEDURE — 86706 HEP B SURFACE ANTIBODY: CPT

## 2025-01-29 PROCEDURE — 36415 COLL VENOUS BLD VENIPUNCTURE: CPT

## 2025-01-29 PROCEDURE — 84402 ASSAY OF FREE TESTOSTERONE: CPT

## 2025-01-29 PROCEDURE — 87340 HEPATITIS B SURFACE AG IA: CPT

## 2025-01-29 PROCEDURE — 80076 HEPATIC FUNCTION PANEL: CPT

## 2025-01-29 PROCEDURE — 83036 HEMOGLOBIN GLYCOSYLATED A1C: CPT

## 2025-01-29 PROCEDURE — 86708 HEPATITIS A ANTIBODY: CPT

## 2025-01-31 RX ORDER — INSULIN GLARGINE-YFGN 100 [IU]/ML
INJECTION, SOLUTION SUBCUTANEOUS
Qty: 27 ML | Refills: 1 | Status: SHIPPED | OUTPATIENT
Start: 2025-01-31

## 2025-01-31 NOTE — TELEPHONE ENCOUNTER
Endocrine refill protocol for basal insulins     Protocol Criteria: FAILED Reason: No Visit in required time frame [facility] sent    If all below requirements are met, send a 90-day supply with 1 refill per provider protocol.       Verify Appointment with Endocrinology completed in the last 6 months or scheduled in the next 3 months.  Verify A1C has been completed within the last 6 months and is below 8.5%     Last completed office visit:2/15/2024 Tavares Gomez MD   Next scheduled Follow up: No future appointments. [facility] sent  Last A1c result: Last A1c value was 7.3% done 1/29/2025.

## 2025-01-31 NOTE — TELEPHONE ENCOUNTER
Patient is requesting a refill on his Semglee medication. Per the patient he is out of medication. Pharmacy: [facility]/[place], [place] (Listed)     Current Outpatient Medications   Medication Sig Dispense Refill    SEMGLEE, YFGN, 100 UNIT/ML Subcutaneous Solution Pen-injector INJECT 30 UNITS DAILY INTO THE SKIN 27 mL 1

## 2025-01-31 NOTE — TELEPHONE ENCOUNTER
Please review; protocol failed/ has no protocol    Lexie Groves7 minutes ago (11:10 AM)     BP  Patient is requesting a refill on his Semglee medication. Per the patient he is out of medication. Pharmacy: [facility]/[place], [place] (Listed)              Requested Prescriptions   Pending Prescriptions Disp Refills    Insulin Glargine-yfgn (SEMGLEE, YFGN,) 100 UNIT/ML Subcutaneous Solution Pen-injector 27 mL 1       Diabetes Medication Protocol Failed - 1/31/2025 11:18 AM        Failed - Medication is active on med list        Passed - Last A1C < 7.5 and within past 6 months     Lab Results   Component Value Date    A1C 7.3 (H) 01/29/2025             Passed - In person appointment or virtual visit in the past 6 mos or appointment in next 3 mos     Recent Outpatient Visits              1 month ago Primary hypertension    [facility], Aureliano Owen MD    Office Visit    3 months ago CKD (chronic kidney disease) stage 2, GFR 60-89 ml/min    [facility] [place]    Office Visit    4 months ago Abdominal bloating    [facility], TAMAR Oliveira MD    Office Visit    4 months ago Abdominal bloating    [facility], Aureliano Owen MD    Office Visit    9 months ago Nausea    [facility], TAMAR Oliveira MD    Office Visit                      Passed - Microalbumin procedure in past 12 months or taking ACE/ARB        Passed - EGFRCR or GFRAA > 50     GFR Evaluation  EGFRCR: 64 , resulted on 10/21/2024          Passed - GFR in the past 12 months           Recent Outpatient Visits              1 month ago Primary hypertension    [facility], Aureliano Owen MD    Office Visit    3 months ago CKD (chronic kidney disease) stage 2, GFR 60-89 ml/min    [facility], [facility], Donato Henderson MD    Office Visit    4 months ago Abdominal bloating    [facility], [facility], TAMAR Oliveira MD    Office Visit    4 months ago Abdominal bloating    [facility], [facility], Aureliano Owen MD    Office Visit    9 months ago Nausea    [facility], [facility], TAMAR Oliveira MD    Office Visit               0

## 2025-02-01 LAB
FREE TESTOST DIRECT: 8.6 PG/ML
TESTOSTERONE: 234 NG/DL

## 2025-02-03 NOTE — PROGRESS NOTES
Dear JUAQUIN,    Your liver numbers are stable. You are immunized against hepatitis B.     However you are NOT immunized for hepatitis A. Please talk to your primary care doctor about getting a vaccination for hepatitis A- it can protect you for life.    Dr. Zaidi

## 2025-02-18 ENCOUNTER — OFFICE VISIT (OUTPATIENT)
Dept: INTERNAL MEDICINE CLINIC | Facility: CLINIC | Age: 50
End: 2025-02-18

## 2025-02-18 VITALS
BODY MASS INDEX: 37.24 KG/M2 | TEMPERATURE: 98 F | DIASTOLIC BLOOD PRESSURE: 64 MMHG | RESPIRATION RATE: 17 BRPM | HEIGHT: 73 IN | OXYGEN SATURATION: 98 % | WEIGHT: 281 LBS | SYSTOLIC BLOOD PRESSURE: 110 MMHG | HEART RATE: 76 BPM

## 2025-02-18 DIAGNOSIS — I10 PRIMARY HYPERTENSION: Primary | ICD-10-CM

## 2025-02-18 DIAGNOSIS — Z23 NEED FOR VACCINATION AGAINST HEPATITIS A: ICD-10-CM

## 2025-02-18 DIAGNOSIS — E11.22 TYPE 2 DIABETES MELLITUS WITH STAGE 3 CHRONIC KIDNEY DISEASE, WITHOUT LONG-TERM CURRENT USE OF INSULIN, UNSPECIFIED WHETHER STAGE 3A OR 3B CKD (HCC): ICD-10-CM

## 2025-02-18 DIAGNOSIS — Z23 NEED FOR HEPATITIS B VACCINATION: ICD-10-CM

## 2025-02-18 DIAGNOSIS — N18.30 TYPE 2 DIABETES MELLITUS WITH STAGE 3 CHRONIC KIDNEY DISEASE, WITHOUT LONG-TERM CURRENT USE OF INSULIN, UNSPECIFIED WHETHER STAGE 3A OR 3B CKD (HCC): ICD-10-CM

## 2025-02-18 PROCEDURE — 90746 HEPB VACCINE 3 DOSE ADULT IM: CPT | Performed by: INTERNAL MEDICINE

## 2025-02-18 PROCEDURE — 90472 IMMUNIZATION ADMIN EACH ADD: CPT | Performed by: INTERNAL MEDICINE

## 2025-02-18 PROCEDURE — 90632 HEPA VACCINE ADULT IM: CPT | Performed by: INTERNAL MEDICINE

## 2025-02-18 PROCEDURE — 90471 IMMUNIZATION ADMIN: CPT | Performed by: INTERNAL MEDICINE

## 2025-02-18 PROCEDURE — 99213 OFFICE O/P EST LOW 20 MIN: CPT | Performed by: INTERNAL MEDICINE

## 2025-02-18 NOTE — PROGRESS NOTES
Subjective:     Patient ID: Nicolle Bagley is a 49 year old male.    Patient comes in today for follow-up overall doing okay denies any complaints wondering about needing hep A and hep B vaccine recently had labs done by GI and showed no immunity otherwise doing okay patient says he has not done his eye exam recently but looking at the chart I do not see        History/Other:   Review of Systems   Constitutional: Negative.  Negative for fatigue and fever.   HENT: Negative.  Negative for congestion.    Eyes: Negative.    Respiratory: Negative.  Negative for cough, shortness of breath and wheezing.    Cardiovascular: Negative.  Negative for chest pain, palpitations and leg swelling.   Gastrointestinal: Negative.    Endocrine: Negative for cold intolerance and heat intolerance.   Genitourinary: Negative.  Negative for dysuria, flank pain and hematuria.   Musculoskeletal: Negative.  Negative for arthralgias, back pain and myalgias.   Skin: Negative.    Neurological: Negative.  Negative for dizziness, tremors, syncope, weakness and headaches.   Psychiatric/Behavioral: Negative.  Negative for agitation, behavioral problems and suicidal ideas. The patient is not nervous/anxious.      Current Outpatient Medications   Medication Sig Dispense Refill    SEMGLEE, YFGN, 100 UNIT/ML Subcutaneous Solution Pen-injector INJECT 30 UNITS DAILY INTO THE SKIN 27 mL 1    Insulin Glargine-yfgn (SEMGLEE, YFGN,) 100 UNIT/ML Subcutaneous Solution Pen-injector INJECT 30 UNITS DAILY INTO THE SKIN 27 mL 1    Insulin Lispro (HUMALOG KWIKPEN) 200 UNIT/ML Subcutaneous Solution Pen-injector Inject 10 Units into the skin in the morning, at noon, and at bedtime. 27 mL 1    hydroCHLOROthiazide 12.5 MG Oral Tab Take 1 tablet (12.5 mg total) by mouth daily. 90 tablet 3    atorvastatin 20 MG Oral Tab Take 1 tablet (20 mg total) by mouth daily.      pantoprazole 40 MG Oral Tab EC Take 1 tablet (40 mg total) by mouth before breakfast. 30 tablet 3     dapagliflozin (FARXIGA) 10 MG Oral Tab Take 1 tablet (10 mg total) by mouth daily. 90 tablet 3    Pediatric Multiple Vit-C-FA (MULTIPLE VITAMINS OR) Take by mouth daily.      lisinopril 10 MG Oral Tab Take 1 tablet (10 mg total) by mouth daily.      Continuous Blood Gluc Sensor (DEXCOM G7 SENSOR) Does not apply Misc 1 each Every 10 days. Use as directed every 10 days 3 each 5     Allergies:Allergies[1]    Past Medical History:    Diabetes (HCC)    Essential hypertension      No past surgical history on file.   No family history on file.   Social History:   Social History     Socioeconomic History    Marital status:    Tobacco Use    Smoking status: Never     Passive exposure: Never    Smokeless tobacco: Never   Vaping Use    Vaping status: Never Used   Substance and Sexual Activity    Alcohol use: Not Currently    Drug use: Not Currently     Social Drivers of Health      Received from [employer]    [facility]        Objective:   Physical Exam  Vitals and nursing note reviewed.   Constitutional:       Appearance: He is well-developed.   HENT:      Head: Normocephalic and atraumatic.      Right Ear: External ear normal.      Left Ear: External ear normal.      Nose: Nose normal.   Eyes:      Conjunctiva/sclera: Conjunctivae normal.      Pupils: Pupils are equal, round, and reactive to light.   Cardiovascular:      Rate and Rhythm: Normal rate and regular rhythm.      Heart sounds: Normal heart sounds.   Pulmonary:      Effort: Pulmonary effort is normal.      Breath sounds: Normal breath sounds.   Abdominal:      General: Bowel sounds are normal.      Palpations: Abdomen is soft.   Genitourinary:     Penis: Normal.       Prostate: Normal.      Rectum: Normal.   Musculoskeletal:         General: Normal range of motion.      Cervical back: Normal range of motion and neck supple.   Skin:     General: Skin is warm and dry.   Neurological:      Mental Status: He is alert and oriented to person,  place, and time.      Deep Tendon Reflexes: Reflexes are normal and symmetric.         Assessment & Plan:   1. Primary hypertension blood pressure well-controlled continue current treatment   2. Type 2 diabetes mellitus with stage 3 chronic kidney disease, without long-term current use of insulin, unspecified whether stage 3a or 3b CKD (HCC) recent A1c stable slightly watching diet also will retest next visit   3. Need for vaccination against hepatitis A will give hep A   4. Need for hepatitis B vaccination will get hep B       Orders Placed This Encounter   Procedures    HEPATITIS A VACCINE    HEPATITIS B VACCINE, OVER 20       Meds This Visit:  Requested Prescriptions      No prescriptions requested or ordered in this encounter       Imaging & Referrals:  HEPATITIS A VACCINE  HEPATITIS B VACCINE, OVER 20            [1] No Known Allergies

## 2025-03-04 ENCOUNTER — OFFICE VISIT (OUTPATIENT)
Facility: LOCATION | Age: 50
End: 2025-03-04

## 2025-03-04 VITALS
BODY MASS INDEX: 37.37 KG/M2 | SYSTOLIC BLOOD PRESSURE: 110 MMHG | HEART RATE: 80 BPM | WEIGHT: 282 LBS | HEIGHT: 73 IN | DIASTOLIC BLOOD PRESSURE: 70 MMHG

## 2025-03-04 DIAGNOSIS — R73.09 HIGH GLUCOSE LEVEL: ICD-10-CM

## 2025-03-04 DIAGNOSIS — E66.812 CLASS 2 OBESITY WITH BODY MASS INDEX (BMI) OF 37.0 TO 37.9 IN ADULT, UNSPECIFIED OBESITY TYPE, UNSPECIFIED WHETHER SERIOUS COMORBIDITY PRESENT: ICD-10-CM

## 2025-03-04 DIAGNOSIS — E11.65 UNCONTROLLED TYPE 2 DIABETES MELLITUS WITH HYPERGLYCEMIA (HCC): Primary | ICD-10-CM

## 2025-03-04 DIAGNOSIS — I10 PRIMARY HYPERTENSION: ICD-10-CM

## 2025-03-04 DIAGNOSIS — N18.9 CHRONIC KIDNEY DISEASE, UNSPECIFIED CKD STAGE: ICD-10-CM

## 2025-03-04 DIAGNOSIS — I10 HYPERTENSION, UNSPECIFIED TYPE: ICD-10-CM

## 2025-03-04 DIAGNOSIS — E11.65 HYPERGLYCEMIA DUE TO DIABETES MELLITUS (HCC): ICD-10-CM

## 2025-03-04 DIAGNOSIS — E78.5 DYSLIPIDEMIA: ICD-10-CM

## 2025-03-04 DIAGNOSIS — R79.89 LOW TESTOSTERONE: ICD-10-CM

## 2025-03-04 PROCEDURE — 99214 OFFICE O/P EST MOD 30 MIN: CPT | Performed by: INTERNAL MEDICINE

## 2025-03-04 RX ORDER — TIRZEPATIDE 7.5 MG/.5ML
7.5 INJECTION, SOLUTION SUBCUTANEOUS
Qty: 6 ML | Refills: 0 | Status: SHIPPED | OUTPATIENT
Start: 2025-04-29

## 2025-03-04 RX ORDER — TIRZEPATIDE 2.5 MG/.5ML
2.5 INJECTION, SOLUTION SUBCUTANEOUS
Qty: 2 ML | Refills: 0 | Status: SHIPPED | OUTPATIENT
Start: 2025-03-04

## 2025-03-04 RX ORDER — TIRZEPATIDE 5 MG/.5ML
5 INJECTION, SOLUTION SUBCUTANEOUS
Qty: 2 ML | Refills: 0 | Status: SHIPPED | OUTPATIENT
Start: 2025-04-01

## 2025-03-04 NOTE — PATIENT INSTRUCTIONS
8-9 AM labs after good night sleep to check testosterone   Labs before next visit   Will start mounjaro - if covered by insurance-  and titrate monthly     Continue same meds   Target wt loss

## 2025-03-04 NOTE — PROGRESS NOTES
Reason for Visit:  uncontrolled DM.  Requesting Physician:   Opal Rmoero MD      CHIEF COMPLAINT:    Chief Complaint   Patient presents with    Diabetes     F/u      Last visit was on 2/15/2024     HISTORY OF PRESENT ILLNESS:   Pamela Bagley is a 49 year old male who presents with  uncontrolled DM, CKD, HTN, DLP, obese and low testosterone     Follow up with nephrology and hepatology     He asked PCP to check labs d/t concern he has low testo levels.   Labs at noon showed Testo 234. He has low libido .   Bedtime 10 AM, gets up 6-7 AM.     He is under stress. 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           t2DM was Dx in 2017   Now on   Semglee  30 units   Humalog 10 units tid   Farxiga 10 mg     Stopped metformin d/t GI SE   Was on januvia but stopped d/t cost   BG at home before eating ~130s          Lab Results   Component Value Date    A1C 7.3 (H) 2025    A1C 7.1 (H) 2024    A1C 8.5 (H) 2024    A1C 15.3 (H) 2024      DM quality measures:  A1C/Blood pressure: as reported above   Nephropathy screenin2024  continue ace /arb rx.   LIPID screenin2024 .   statin rx.   Last dilated eye exam: No data recorded  10/2023  Exam shows retinopathy? No data recorded  Last diabetic foot exam: No data recorded  Dentist : recommend every 6m  Neuropathy:  no   CAD/ASCVD/PAD/CVA: no    Micro Albumen/Creatinine:    Lab Results   Component Value Date    MICROALBCREA  2024      Comment:      Unable to calculate due to Urine Microalbumin <0.3 mg/dL        MICROALBCREA  2024      Comment:      Unable to calculate due to Urine Microalbumin <0.3 mg/dL        MICROALBCREA  2024      Comment:      Unable to calculate due to Urine Microalbumin <0.5 mg/dL       Cholesterol Meds: atorvastatin Tabs - 20 MG   Cholesterol: 131, done on 2024.  HDL Cholesterol: 37, done on 2024.  LDL Cholesterol: 79, done on 2024.  TriGlycerides 75, done on 2024.         ASSESSMENT AND PLAN:    49  year old male who presents with  uncontrolled complicated DM, CKD, HTN, DLP, obese   and low testosterone.   We discussed it is most likely multifactorial   He will benefit from controlling BG and wt loss.      Target A1c 6.5%  Target BG   BEFORE MEAL 100-130mg/dl  2hrs AFTER MEAL less than 180mg/dl    plan  8-9 AM labs after good night sleep to check testosterone   Labs before next visit   Will start mounjaro - if covered by insurance-  and titrate monthly     Continue same meds   Target wt loss       Pending CDE, podiatry, ophthalmology   DM Meds: dapagliflozin Tabs - 10 MG; HumaLOG KwikPen Sopn - 200 UNIT/ML; Insulin Glargine-yfgn Sopn - 100 UNIT/ML; Mounjaro Soaj - 2.5 MG/0.5ML, 5 MG/0.5ML, 7.5 MG/0.5ML; Semglee (yfgn) Sopn - 100 UNIT/ML    Walk daily 30 min/day   Stop sugary drinks   Limit simple cabs in diet - grapes are high in  sugar          If you have low blood sugar <70, take 15 grams of carb (8 oz juice or regular soda) and recheck in 15 minutes.    Follow up with podiatry and eye doctor annually.   Patients need to wear covered shoes all the time and check feet daily.   Bring your meter/BG log to the next visit.          GLP-1 agonists:  No personal or family history of MEN syndrome   No personal history pancreatitis   Patient counselled regarding side effects including injection site reactions, nausea, vomiting, diarrhea, pancreatitis, gastroparesis and rare side effect candis Julian syndrome.         We discussed these in detail with the patient and negotiated which of numerous possible changes in the in the treatment plan that would be acceptable to them. The patient remains at ongoing is at high risk for complications related to uncontrolled diabetes and treatment.  The patient requires a great deal of self-management and support. We expect the patient's risk to be reduced with the changes to the treatment plan that we recommended today.   We reviewed a very large amount of complicated data  including BG target range, basal insulin doses, meal   insulin boluses,  time of BG testing and insulin administration in relations to meals.          PAST MEDICAL HISTORY:   Past Medical History:    Diabetes (HCC)    Essential hypertension       PAST SURGICAL HISTORY:   History reviewed. No pertinent surgical history.  Hernia     CURRENT MEDICATIONS:     [START ON 4/29/2025] Tirzepatide (MOUNJARO) 7.5 MG/0.5ML Subcutaneous Solution Auto-injector Inject 7.5 mg into the skin every 7 days. 6 mL 0    [START ON 4/1/2025] Tirzepatide (MOUNJARO) 5 MG/0.5ML Subcutaneous Solution Auto-injector Inject 5 mg into the skin every 7 days. 2 mL 0    Tirzepatide (MOUNJARO) 2.5 MG/0.5ML Subcutaneous Solution Auto-injector Inject 2.5 mg into the skin every 7 days. 2 mL 0    SEMGLEE, YFGN, 100 UNIT/ML Subcutaneous Solution Pen-injector INJECT 30 UNITS DAILY INTO THE SKIN 27 mL 1    Insulin Lispro (HUMALOG KWIKPEN) 200 UNIT/ML Subcutaneous Solution Pen-injector Inject 10 Units into the skin in the morning, at noon, and at bedtime. 27 mL 1    hydroCHLOROthiazide 12.5 MG Oral Tab Take 1 tablet (12.5 mg total) by mouth daily. 90 tablet 3    atorvastatin 20 MG Oral Tab Take 1 tablet (20 mg total) by mouth daily.      dapagliflozin (FARXIGA) 10 MG Oral Tab Take 1 tablet (10 mg total) by mouth daily. 90 tablet 3    lisinopril 10 MG Oral Tab Take 1 tablet (10 mg total) by mouth daily.           ALLERGIES:  No Known Allergies  None     SOCIAL HISTORY:    Social History     Socioeconomic History    Marital status:    Tobacco Use    Smoking status: Never     Passive exposure: Never    Smokeless tobacco: Never   Vaping Use    Vaping status: Never Used   Substance and Sexual Activity    Alcohol use: Not Currently    Drug use: Not Currently       FAMILY HISTORY:   History reviewed. No pertinent family history.   Both parents, brother, GF and aunt with DM    REVIEW OF SYSTEMS:  All negative other than HPI      PHYSICAL EXAM:   Height: 6' 1\"  (185.4 cm) (03/04 1025)  Weight: 282 lb (127.9 kg) (03/04 1025)  BSA (Calculated - sq m): 2.49 sq meters (03/04 1025)  Pulse: 80 (03/04 1025)  BP: 110/70 (03/04 1025)  Temp: --  Do Not Use - Resp Rate: --  SpO2: --    Body mass index is 37.21 kg/m².  Obese       DATA:     Pertinent data reviewed      Latest Reference Range & Units 01/19/24 12:15   HEMOGLOBIN A1c <5.7 % 15.3 (H)   (H): Data is abnormally high   Lab Results   Component Value Date    A1C 7.3 (H) 01/29/2025    A1C 7.1 (H) 07/25/2024    A1C 8.5 (H) 04/08/2024    A1C 15.3 (H) 01/19/2024      Micro Albumen/Creatinine:    Lab Results   Component Value Date    MICROALBCREA  04/08/2024      Comment:      Unable to calculate due to Urine Microalbumin <0.3 mg/dL        MICROALBCREA  02/02/2024      Comment:      Unable to calculate due to Urine Microalbumin <0.3 mg/dL        MICROALBCREA  01/16/2024      Comment:      Unable to calculate due to Urine Microalbumin <0.5 mg/dL       Cholesterol: 131, done on 4/8/2024.  HDL Cholesterol: 37, done on 4/8/2024.  LDL Cholesterol: 79, done on 4/8/2024.  TriGlycerides 75, done on 4/8/2024.            No results for input(s): \"TSH\", \"T4F\", \"T3F\", \"THYP\" in the last 72 hours.  No results found.        Orders Placed This Encounter   Procedures    Hemoglobin A1C    Microalb/Creat Ratio, Random Urine    Lipid Panel    TSH W Reflex To Free T4    Comp Metabolic Panel (14)    Testosterone,Total and Weakly Bound w/ SHBG     Orders Placed This Encounter    Hemoglobin A1C     Standing Status:   Future     Standing Expiration Date:   3/4/2026     Order Specific Question:   Release to patient     Answer:   Immediate    Microalb/Creat Ratio, Random Urine     Standing Status:   Future     Standing Expiration Date:   3/4/2026     Order Specific Question:   Release to patient     Answer:   Immediate    Lipid Panel     Standing Status:   Future     Standing Expiration Date:   3/4/2026     Order Specific Question:   Release to patient      Answer:   Immediate    TSH W Reflex To Free T4     Standing Status:   Future     Standing Expiration Date:   3/4/2026     Order Specific Question:   Release to patient     Answer:   Immediate    Comp Metabolic Panel (14)     Standing Status:   Future     Standing Expiration Date:   3/4/2026     Order Specific Question:   Release to patient     Answer:   Immediate    Testosterone,Total and Weakly Bound w/ SHBG     Standing Status:   Future     Standing Expiration Date:   3/4/2026     Order Specific Question:   Release to patient     Answer:   Immediate    Tirzepatide (MOUNJARO) 7.5 MG/0.5ML Subcutaneous Solution Auto-injector     Sig: Inject 7.5 mg into the skin every 7 days.     Dispense:  6 mL     Refill:  0     Do not send for refills    Tirzepatide (MOUNJARO) 5 MG/0.5ML Subcutaneous Solution Auto-injector     Sig: Inject 5 mg into the skin every 7 days.     Dispense:  2 mL     Refill:  0     Do not send for refills    Tirzepatide (MOUNJARO) 2.5 MG/0.5ML Subcutaneous Solution Auto-injector     Sig: Inject 2.5 mg into the skin every 7 days.     Dispense:  2 mL     Refill:  0     Do not send for refills          This is a specialized patient consultation in endocrinology and required comprehensive review of prior records, as well as current evaluation, with time required for consideration of complex endocrine issues and consultation. For this visit, I personally interviewed the patient, and family member if accompanied, performed the pertinent parts of the history and physical examination. ROS included screening for appropriate endocrine conditions.   Today's diagnosis and plan were reviewed in detail with the patient who states understanding and agrees with plan. I discussed with the patient possible diagnosis, differential diagnosis, need for work up , treatment options, alternatives and side effects.     Please see note for details about time spent which includes:   · pre-visit preparation  · reviewing records  ·  face to face time with the patient   · timely documentation of the encounter  · ordering medications/tests  · communication with care team  · care coordination    I appreciate the opportunity to be part of your patient's medical care and will keep you, as the referring and primary physicians, informed about the care of your patient, including possible future surgery and pathology findings. Please feel free to contact me should you have any questions.      Tavares Gomez MD

## 2025-03-05 ENCOUNTER — LAB ENCOUNTER (OUTPATIENT)
Dept: LAB | Age: 50
End: 2025-03-05
Attending: INTERNAL MEDICINE
Payer: COMMERCIAL

## 2025-03-05 DIAGNOSIS — R73.09 HIGH GLUCOSE LEVEL: ICD-10-CM

## 2025-03-05 DIAGNOSIS — E11.65 HYPERGLYCEMIA DUE TO DIABETES MELLITUS (HCC): ICD-10-CM

## 2025-03-05 DIAGNOSIS — E11.65 UNCONTROLLED TYPE 2 DIABETES MELLITUS WITH HYPERGLYCEMIA (HCC): ICD-10-CM

## 2025-03-05 DIAGNOSIS — I10 PRIMARY HYPERTENSION: ICD-10-CM

## 2025-03-05 DIAGNOSIS — N18.9 CHRONIC KIDNEY DISEASE, UNSPECIFIED CKD STAGE: ICD-10-CM

## 2025-03-05 DIAGNOSIS — I10 HYPERTENSION, UNSPECIFIED TYPE: ICD-10-CM

## 2025-03-05 DIAGNOSIS — E78.5 DYSLIPIDEMIA: ICD-10-CM

## 2025-03-05 PROCEDURE — 84410 TESTOSTERONE BIOAVAILABLE: CPT

## 2025-03-05 PROCEDURE — 36415 COLL VENOUS BLD VENIPUNCTURE: CPT

## 2025-03-07 ENCOUNTER — OFFICE VISIT (OUTPATIENT)
Dept: INTERNAL MEDICINE CLINIC | Facility: CLINIC | Age: 50
End: 2025-03-07

## 2025-03-07 VITALS
RESPIRATION RATE: 18 BRPM | WEIGHT: 284.38 LBS | SYSTOLIC BLOOD PRESSURE: 128 MMHG | TEMPERATURE: 98 F | DIASTOLIC BLOOD PRESSURE: 78 MMHG | HEART RATE: 91 BPM | HEIGHT: 73 IN | BODY MASS INDEX: 37.69 KG/M2 | OXYGEN SATURATION: 99 %

## 2025-03-07 DIAGNOSIS — N18.30 TYPE 2 DIABETES MELLITUS WITH STAGE 3 CHRONIC KIDNEY DISEASE, WITHOUT LONG-TERM CURRENT USE OF INSULIN, UNSPECIFIED WHETHER STAGE 3A OR 3B CKD (HCC): ICD-10-CM

## 2025-03-07 DIAGNOSIS — J01.00 ACUTE MAXILLARY SINUSITIS, RECURRENCE NOT SPECIFIED: Primary | ICD-10-CM

## 2025-03-07 DIAGNOSIS — E11.22 TYPE 2 DIABETES MELLITUS WITH STAGE 3 CHRONIC KIDNEY DISEASE, WITHOUT LONG-TERM CURRENT USE OF INSULIN, UNSPECIFIED WHETHER STAGE 3A OR 3B CKD (HCC): ICD-10-CM

## 2025-03-07 PROCEDURE — 99214 OFFICE O/P EST MOD 30 MIN: CPT | Performed by: INTERNAL MEDICINE

## 2025-03-07 RX ORDER — AMOXICILLIN 875 MG/1
875 TABLET, COATED ORAL 2 TIMES DAILY
Qty: 20 TABLET | Refills: 0 | Status: SHIPPED | OUTPATIENT
Start: 2025-03-07 | End: 2025-03-17

## 2025-03-07 NOTE — PROGRESS NOTES
Subjective:   Patient ID: Nicolle Bagley is a 49 year old male.    HPI    Patient comes in today with complaint of ongoing sinus congestion pain discomfort green discharge has tried over-the-counter medication antihistamine decongestant not helping too much    History/Other:   Review of Systems   Constitutional: Negative.  Negative for fatigue and fever.   HENT:  Positive for congestion, sinus pressure and sinus pain.    Eyes: Negative.    Respiratory: Negative.  Negative for cough, shortness of breath and wheezing.    Cardiovascular: Negative.  Negative for chest pain, palpitations and leg swelling.   Gastrointestinal: Negative.    Endocrine: Negative for cold intolerance and heat intolerance.   Genitourinary: Negative.  Negative for dysuria, flank pain and hematuria.   Musculoskeletal: Negative.  Negative for arthralgias, back pain and myalgias.   Skin: Negative.    Neurological: Negative.  Negative for dizziness, tremors, syncope, weakness and headaches.   Psychiatric/Behavioral: Negative.  Negative for agitation, behavioral problems and suicidal ideas. The patient is not nervous/anxious.      Current Outpatient Medications   Medication Sig Dispense Refill    amoxicillin 875 MG Oral Tab Take 1 tablet (875 mg total) by mouth 2 (two) times daily for 10 days. 20 tablet 0    [START ON 4/29/2025] Tirzepatide (MOUNJARO) 7.5 MG/0.5ML Subcutaneous Solution Auto-injector Inject 7.5 mg into the skin every 7 days. 6 mL 0    [START ON 4/1/2025] Tirzepatide (MOUNJARO) 5 MG/0.5ML Subcutaneous Solution Auto-injector Inject 5 mg into the skin every 7 days. 2 mL 0    Tirzepatide (MOUNJARO) 2.5 MG/0.5ML Subcutaneous Solution Auto-injector Inject 2.5 mg into the skin every 7 days. 2 mL 0    SEMGLEE, YFGN, 100 UNIT/ML Subcutaneous Solution Pen-injector INJECT 30 UNITS DAILY INTO THE SKIN 27 mL 1    Insulin Glargine-yfgn (SEMGLEE, YFGN,) 100 UNIT/ML Subcutaneous Solution Pen-injector INJECT 30 UNITS DAILY INTO THE SKIN 27 mL 1     Insulin Lispro (HUMALOG KWIKPEN) 200 UNIT/ML Subcutaneous Solution Pen-injector Inject 10 Units into the skin in the morning, at noon, and at bedtime. 27 mL 1    hydroCHLOROthiazide 12.5 MG Oral Tab Take 1 tablet (12.5 mg total) by mouth daily. 90 tablet 3    atorvastatin 20 MG Oral Tab Take 1 tablet (20 mg total) by mouth daily.      pantoprazole 40 MG Oral Tab EC Take 1 tablet (40 mg total) by mouth before breakfast. 30 tablet 3    dapagliflozin (FARXIGA) 10 MG Oral Tab Take 1 tablet (10 mg total) by mouth daily. 90 tablet 3    Pediatric Multiple Vit-C-FA (MULTIPLE VITAMINS OR) Take by mouth daily.      lisinopril 10 MG Oral Tab Take 1 tablet (10 mg total) by mouth daily.      Continuous Blood Gluc Sensor (DEXCOM G7 SENSOR) Does not apply Misc 1 each Every 10 days. Use as directed every 10 days 3 each 5     Allergies:Allergies[1]    Objective:   Physical Exam  Vitals and nursing note reviewed.   Constitutional:       Appearance: He is well-developed.   HENT:      Head: Normocephalic and atraumatic.      Right Ear: External ear normal.      Left Ear: External ear normal.      Nose: Nose normal.   Eyes:      Conjunctiva/sclera: Conjunctivae normal.      Pupils: Pupils are equal, round, and reactive to light.   Cardiovascular:      Rate and Rhythm: Normal rate and regular rhythm.      Heart sounds: Normal heart sounds.   Pulmonary:      Effort: Pulmonary effort is normal.      Breath sounds: Normal breath sounds.   Abdominal:      General: Bowel sounds are normal.      Palpations: Abdomen is soft.   Genitourinary:     Penis: Normal.       Prostate: Normal.      Rectum: Normal.   Musculoskeletal:         General: Normal range of motion.      Cervical back: Normal range of motion and neck supple.   Skin:     General: Skin is warm and dry.   Neurological:      Mental Status: He is alert and oriented to person, place, and time.      Deep Tendon Reflexes: Reflexes are normal and symmetric.         Assessment & Plan:   1.  Acute maxillary sinusitis, recurrence not specified we will treat with antibiotic continue to take an antihistamine or decongestant let us know if not better   2. Type 2 diabetes mellitus with stage 3 chronic kidney disease, without long-term current use of insulin, unspecified whether stage 3a or 3b CKD (HCC) will refer to eye specialist patient will come in for annual physical we will do testing       No orders of the defined types were placed in this encounter.      Meds This Visit:  Requested Prescriptions     Signed Prescriptions Disp Refills    amoxicillin 875 MG Oral Tab 20 tablet 0     Sig: Take 1 tablet (875 mg total) by mouth 2 (two) times daily for 10 days.       Imaging & Referrals:  OPHTHALMOLOGY - INTERNAL         [1] No Known Allergies

## 2025-03-12 LAB
SEX HORM BIND GLOB: 16.1 NMOL/L
TESTOST % FREE+WEAK BND: 39.8 %
TESTOST FREE+WEAK BND: 105.9 NG/DL
TESTOSTERONE TOT /MS: 266.2 NG/DL

## 2025-03-13 ENCOUNTER — TELEPHONE (OUTPATIENT)
Dept: ENDOCRINOLOGY CLINIC | Facility: CLINIC | Age: 50
End: 2025-03-13

## 2025-03-13 NOTE — TELEPHONE ENCOUNTER
Patient called to speak with a nurse. The patient wants to know if he needs to stop taking the farxiga, Semglee, and the humalog. Please call the patient.     Rfl:

## 2025-03-18 NOTE — TELEPHONE ENCOUNTER
Continue same meds   Lab Results   Component Value Date    A1C 7.3 (H) 01/29/2025    A1C 7.1 (H) 07/25/2024    A1C 8.5 (H) 04/08/2024    A1C 15.3 (H) 01/19/2024      thanks

## 2025-03-19 ENCOUNTER — TELEPHONE (OUTPATIENT)
Dept: ENDOCRINOLOGY CLINIC | Facility: CLINIC | Age: 50
End: 2025-03-19

## 2025-03-19 NOTE — TELEPHONE ENCOUNTER
Dr Gomez-Spoke with patient states he has not started the Mounjaro yet- he wants to know once he starts Mounjaro should he stay on same dosage of current diabetes regimen, has not started on Mounjaro yet.   Semglee- 30 units daily  Humualog- 10 units TID  Farxiga- 10 mg- 1 tab daily.

## 2025-04-04 ENCOUNTER — TELEPHONE (OUTPATIENT)
Dept: INTERNAL MEDICINE CLINIC | Facility: CLINIC | Age: 50
End: 2025-04-04

## 2025-04-04 NOTE — TELEPHONE ENCOUNTER
Dr. Romero - patient notified, insisted on appointment Monday.    RN consulted with Dr. Romero who recommended Emergency Room if suspected blood clot.    RN called patient back and informed.   Patient was already back in his car and driving.     Patient stated \"just schedule me with Dr. Romero, 1st available\".     Offered 4/7/25 at 8:00 AM, patient requested 11:00 AM.   Appointment scheduled.     RN informed patient a blood clot is a medical emergency, he needs to proceed to ER or call 911 if symptoms worsening.    Patient verbalized understanding.

## 2025-04-04 NOTE — TELEPHONE ENCOUNTER
Dr. Romero - please advise    Spoke to patient, full name and date of birth verified.  Patient is at xray - existing order is for xr knee (3 views), left.    Patient stated the problem is not with his knee.   Patient stated he has sharp pain, like tingling in his RIGHT ankle and foot, intermittent when he is sitting, also reports pain in inner thigh of RIGHT leg.     Patient reports past history of a blood clot when he was 11 years old.     Patient denies swelling anywhere in the RIGHT foot, ankle, or leg.   Patient is requesting a different order to make sure it is not a blood clot.

## 2025-04-16 ENCOUNTER — TELEPHONE (OUTPATIENT)
Dept: INTERNAL MEDICINE CLINIC | Facility: CLINIC | Age: 50
End: 2025-04-16

## 2025-05-20 ENCOUNTER — TELEPHONE (OUTPATIENT)
Facility: CLINIC | Age: 50
End: 2025-05-20

## 2025-05-20 ENCOUNTER — OFFICE VISIT (OUTPATIENT)
Dept: INTERNAL MEDICINE CLINIC | Facility: CLINIC | Age: 50
End: 2025-05-20

## 2025-05-20 ENCOUNTER — TELEPHONE (OUTPATIENT)
Dept: INTERNAL MEDICINE CLINIC | Facility: CLINIC | Age: 50
End: 2025-05-20

## 2025-05-20 VITALS
OXYGEN SATURATION: 99 % | WEIGHT: 285 LBS | TEMPERATURE: 98 F | BODY MASS INDEX: 37.77 KG/M2 | HEART RATE: 78 BPM | DIASTOLIC BLOOD PRESSURE: 66 MMHG | HEIGHT: 73 IN | SYSTOLIC BLOOD PRESSURE: 110 MMHG | RESPIRATION RATE: 17 BRPM

## 2025-05-20 DIAGNOSIS — B35.3 TINEA PEDIS OF BOTH FEET: ICD-10-CM

## 2025-05-20 DIAGNOSIS — R14.0 ABDOMINAL BLOATING: Primary | ICD-10-CM

## 2025-05-20 PROCEDURE — 90472 IMMUNIZATION ADMIN EACH ADD: CPT | Performed by: INTERNAL MEDICINE

## 2025-05-20 PROCEDURE — 90471 IMMUNIZATION ADMIN: CPT | Performed by: INTERNAL MEDICINE

## 2025-05-20 PROCEDURE — 99213 OFFICE O/P EST LOW 20 MIN: CPT | Performed by: INTERNAL MEDICINE

## 2025-05-20 PROCEDURE — 90746 HEPB VACCINE 3 DOSE ADULT IM: CPT | Performed by: INTERNAL MEDICINE

## 2025-05-20 PROCEDURE — 90677 PCV20 VACCINE IM: CPT | Performed by: INTERNAL MEDICINE

## 2025-05-20 NOTE — TELEPHONE ENCOUNTER
Pt at Pharmacy ,stated pharmacy advised rx Clotrimazole 1 % External Lotion  is no longer in lotion , Spoke to Pharmacy -asking if can be changed to cream - gave verbal order to change to cream

## 2025-05-26 NOTE — PROGRESS NOTES
Subjective:     Patient ID: Nicolle Bagley is a 50 year old male.    HPI  Pt comes in today with complaint of ongoing abdominal bloating this is a chronic problem he has seen GI before had a gastric emptying study which was normal patient is on Mounjaro right now by Endo but he says the symptoms has been going on before that.  Patient also complains of bilateral feet skin dryness and peeling  History/Other:   Review of Systems   Constitutional: Negative.  Negative for fatigue and fever.   HENT: Negative.  Negative for congestion.    Eyes: Negative.    Respiratory: Negative.  Negative for cough, shortness of breath and wheezing.    Cardiovascular: Negative.  Negative for chest pain, palpitations and leg swelling.   Gastrointestinal:  Positive for abdominal distention.   Endocrine: Negative for cold intolerance and heat intolerance.   Genitourinary: Negative.  Negative for dysuria, flank pain and hematuria.   Musculoskeletal: Negative.  Negative for arthralgias, back pain and myalgias.        Bl feet toes and foot skin peeling    Skin: Negative.    Neurological: Negative.  Negative for dizziness, tremors, syncope, weakness and headaches.   Psychiatric/Behavioral: Negative.  Negative for agitation, behavioral problems and suicidal ideas. The patient is not nervous/anxious.      Current Medications[1]  Allergies:Allergies[2]    Past Medical History[3]   Past Surgical History[4]   Family History[5]   Social History: Short Social Hx on File[6]     Objective:   Physical Exam  Vitals and nursing note reviewed.   Constitutional:       Appearance: He is well-developed.   HENT:      Head: Normocephalic and atraumatic.      Right Ear: External ear normal.      Left Ear: External ear normal.      Nose: Nose normal.   Eyes:      Conjunctiva/sclera: Conjunctivae normal.      Pupils: Pupils are equal, round, and reactive to light.   Cardiovascular:      Rate and Rhythm: Normal rate and regular rhythm.      Heart sounds: Normal  heart sounds.   Pulmonary:      Effort: Pulmonary effort is normal.      Breath sounds: Normal breath sounds.   Abdominal:      General: Bowel sounds are normal. There is distension.      Palpations: Abdomen is soft.   Genitourinary:     Penis: Normal.       Prostate: Normal.      Rectum: Normal.   Musculoskeletal:         General: Normal range of motion.      Cervical back: Normal range of motion and neck supple.   Skin:     General: Skin is warm and dry.      Comments: Bl feet toes and foot skin peeling    Neurological:      Mental Status: He is alert and oriented to person, place, and time.      Deep Tendon Reflexes: Reflexes are normal and symmetric.         Assessment & Plan:   1. Abdominal bloating ?  Will refer to GI   2. Tinea pedis of both feet will treat with the cream let us know if not better        Orders Placed This Encounter   Procedures    Prevnar 20 (PCV20) [79523]    HEPATITIS B VACCINE, OVER 20       Meds This Visit:  Requested Prescriptions     Signed Prescriptions Disp Refills    Clotrimazole 1 % External Lotion 30 mL 2     Sig: Apply 1 each topically in the morning and 1 each before bedtime.       Imaging & Referrals:  PCV20 VACCINE FOR INTRAMUSCULAR USE  HEPATITIS B VACCINE, OVER 20  GASTRO - INTERNAL            [1]   Current Outpatient Medications   Medication Sig Dispense Refill    Clotrimazole 1 % External Lotion Apply 1 each topically in the morning and 1 each before bedtime. 30 mL 2    SEMGLEE, YFGN, 100 UNIT/ML Subcutaneous Solution Pen-injector INJECT 30 UNITS DAILY INTO THE SKIN 27 mL 1    Insulin Glargine-yfgn (SEMGLEE, YFGN,) 100 UNIT/ML Subcutaneous Solution Pen-injector INJECT 30 UNITS DAILY INTO THE SKIN 27 mL 1    Insulin Lispro (HUMALOG KWIKPEN) 200 UNIT/ML Subcutaneous Solution Pen-injector Inject 10 Units into the skin in the morning, at noon, and at bedtime. 27 mL 1    hydroCHLOROthiazide 12.5 MG Oral Tab Take 1 tablet (12.5 mg total) by mouth daily. 90 tablet 3     atorvastatin 20 MG Oral Tab Take 1 tablet (20 mg total) by mouth daily.      pantoprazole 40 MG Oral Tab EC Take 1 tablet (40 mg total) by mouth before breakfast. 30 tablet 3    dapagliflozin (FARXIGA) 10 MG Oral Tab Take 1 tablet (10 mg total) by mouth daily. 90 tablet 3    Pediatric Multiple Vit-C-FA (MULTIPLE VITAMINS OR) Take by mouth daily.      Continuous Blood Gluc Sensor (DEXCOM G7 SENSOR) Does not apply Misc 1 each Every 10 days. Use as directed every 10 days 3 each 5    lisinopril 10 MG Oral Tab Take 1 tablet (10 mg total) by mouth daily.      Tirzepatide (MOUNJARO) 7.5 MG/0.5ML Subcutaneous Solution Auto-injector Inject 7.5 mg into the skin every 7 days. (Patient not taking: Reported on 5/20/2025) 6 mL 0    Tirzepatide (MOUNJARO) 5 MG/0.5ML Subcutaneous Solution Auto-injector Inject 5 mg into the skin every 7 days. (Patient not taking: Reported on 5/20/2025) 2 mL 0    Tirzepatide (MOUNJARO) 2.5 MG/0.5ML Subcutaneous Solution Auto-injector Inject 2.5 mg into the skin every 7 days. (Patient not taking: Reported on 5/20/2025) 2 mL 0   [2] No Known Allergies  [3]   Past Medical History:   Diabetes (HCC)    Essential hypertension   [4] No past surgical history on file.  [5] No family history on file.  [6]   Social History  Socioeconomic History    Marital status:    Tobacco Use    Smoking status: Never     Passive exposure: Never    Smokeless tobacco: Never   Vaping Use    Vaping status: Never Used   Substance and Sexual Activity    Alcohol use: Not Currently    Drug use: Not Currently     Social Drivers of Health      Received from [facility]

## 2025-06-26 RX ORDER — DAPAGLIFLOZIN 10 MG/1
10 TABLET, FILM COATED ORAL DAILY
Qty: 90 TABLET | Refills: 3 | Status: SHIPPED | OUTPATIENT
Start: 2025-06-26

## 2025-06-27 NOTE — TELEPHONE ENCOUNTER
Refill Per Protocol     Requested Prescriptions   Pending Prescriptions Disp Refills    FARXIGA 10 MG Oral Tab [Pharmacy Med Name: FARXIGA 10 MG TABLET] 90 tablet 3     Sig: TAKE 1 TABLET BY MOUTH EVERY DAY       Diabetes Medication Protocol Passed - 6/26/2025  7:30 PM        Passed - Last A1C < 7.5 and within past 6 months     Lab Results   Component Value Date    A1C 7.3 (H) 01/29/2025             Passed - In person appointment or virtual visit in the past 6 mos or appointment in next 3 mos     Recent Outpatient Visits              [facility]    3 months ago Acute maxillary sinusitis, recurrence not specified    [facility]    3 months ago Uncontrolled type 2 diabetes mellitus with hyperglycemia (HCC)    [facility] Tavares Gomez MD    Office Visit    4 months ago Primary hypertension    [facility]    6 months ago [facility] hypertension    [facility]          Future Appointments         Provider Department Appt Notes    [facility]                    Passed - Microalbumin procedure in past 12 months or taking ACE/ARB        Passed - EGFRCR or GFRAA > 50     GFR Evaluation  EGFRCR: 64 , resulted on 10/21/2024          Passed - GFR in the past 12 months        Passed - Medication is active on med list

## 2025-06-30 ENCOUNTER — NURSE TRIAGE (OUTPATIENT)
Dept: INTERNAL MEDICINE CLINIC | Facility: CLINIC | Age: 50
End: 2025-06-30

## 2025-06-30 DIAGNOSIS — R30.0 BURNING WITH URINATION: Primary | ICD-10-CM

## 2025-06-30 NOTE — TELEPHONE ENCOUNTER
Action Requested: Summary for Provider     []  Critical Lab, Recommendations Needed  [x] Need Additional Advice  []   FYI    []   Need Orders  [x] Need Medications Sent to Pharmacy  []  Other     SUMMARY:pt requesting appt with Dr- none available- this week , burning with urination x 2 days aware of side effect of Farxiga, no urine frequency or Odor , asking if can submit a urine     Reason for call: Urinary Symptoms  Onset: 2 days                    Reason for Disposition   Bad or foul-smelling urine   Urinating more frequently than usual (i.e., frequency)    Protocols used: Urinary Symptoms-A-OH

## 2025-06-30 NOTE — TELEPHONE ENCOUNTER
Spoke to pt, informed him of order and scheduled appt w/ PCP for tomorrow @11:15am.  No further action needed.

## 2025-07-01 ENCOUNTER — OFFICE VISIT (OUTPATIENT)
Dept: INTERNAL MEDICINE CLINIC | Facility: CLINIC | Age: 50
End: 2025-07-01

## 2025-07-01 VITALS
HEIGHT: 73 IN | SYSTOLIC BLOOD PRESSURE: 122 MMHG | DIASTOLIC BLOOD PRESSURE: 80 MMHG | BODY MASS INDEX: 38.97 KG/M2 | TEMPERATURE: 98 F | OXYGEN SATURATION: 99 % | WEIGHT: 294 LBS | HEART RATE: 78 BPM | RESPIRATION RATE: 18 BRPM

## 2025-07-01 DIAGNOSIS — R35.0 FREQUENCY OF URINATION: ICD-10-CM

## 2025-07-01 DIAGNOSIS — R30.0 BURNING WITH URINATION: Primary | ICD-10-CM

## 2025-07-01 LAB
APPEARANCE: CLEAR
BILIRUB UR QL: NEGATIVE
BILIRUBIN: NEGATIVE
CLARITY UR: CLEAR
GLUCOSE (URINE DIPSTICK): NEGATIVE MG/DL
GLUCOSE UR-MCNC: NORMAL MG/DL
HGB UR QL STRIP.AUTO: NEGATIVE
KETONES (URINE DIPSTICK): NEGATIVE MG/DL
KETONES UR-MCNC: NEGATIVE MG/DL
LEUKOCYTE ESTERASE UR QL STRIP.AUTO: NEGATIVE
LEUKOCYTES: NEGATIVE
MULTISTIX LOT#: ABNORMAL NUMERIC
NITRITE UR QL STRIP.AUTO: NEGATIVE
NITRITE, URINE: NEGATIVE
PH UR: 5.5 [PH] (ref 5–8)
PH, URINE: 5.5 (ref 4.5–8)
PROT UR-MCNC: NEGATIVE MG/DL
PROTEIN (URINE DIPSTICK): NEGATIVE MG/DL
SP GR UR STRIP: 1.02 (ref 1–1.03)
SPECIFIC GRAVITY: 1.02 (ref 1–1.03)
URINE-COLOR: YELLOW
UROBILINOGEN UR STRIP-ACNC: NORMAL
UROBILINOGEN,SEMI-QN: 0.2 MG/DL (ref 0–1.9)

## 2025-07-01 PROCEDURE — 81003 URINALYSIS AUTO W/O SCOPE: CPT | Performed by: INTERNAL MEDICINE

## 2025-07-01 PROCEDURE — 99214 OFFICE O/P EST MOD 30 MIN: CPT | Performed by: INTERNAL MEDICINE

## 2025-07-01 RX ORDER — NITROFURANTOIN 25; 75 MG/1; MG/1
100 CAPSULE ORAL 2 TIMES DAILY
Qty: 10 CAPSULE | Refills: 0 | Status: SHIPPED | OUTPATIENT
Start: 2025-07-01 | End: 2025-07-06

## 2025-07-01 NOTE — PROGRESS NOTES
Subjective:   Patient ID: Nicolle Bagley is a 50 year old male.    HPI  Patient comes in today with complaint of having burning urination and some frequency for few days today feels better but worried because he is on Farxiga and he knows that that medication can cause urinary infection denies any flank pain no fever no chills  History/Other:   Review of Systems   Constitutional: Negative.  Negative for fatigue and fever.   HENT: Negative.  Negative for congestion.    Eyes: Negative.    Respiratory: Negative.  Negative for cough, shortness of breath and wheezing.    Cardiovascular: Negative.  Negative for chest pain, palpitations and leg swelling.   Gastrointestinal: Negative.    Endocrine: Negative for cold intolerance and heat intolerance.   Genitourinary:  Positive for dysuria and frequency. Negative for flank pain and hematuria.   Musculoskeletal: Negative.  Negative for arthralgias, back pain and myalgias.   Skin: Negative.    Neurological: Negative.  Negative for dizziness, tremors, syncope, weakness and headaches.   Psychiatric/Behavioral: Negative.  Negative for agitation, behavioral problems and suicidal ideas. The patient is not nervous/anxious.      Current Medications[1]  Allergies:Allergies[2]    Objective:   Physical Exam  Vitals and nursing note reviewed.   Constitutional:       Appearance: He is well-developed.   HENT:      Head: Normocephalic and atraumatic.      Right Ear: External ear normal.      Left Ear: External ear normal.      Nose: Nose normal.   Eyes:      Conjunctiva/sclera: Conjunctivae normal.      Pupils: Pupils are equal, round, and reactive to light.   Cardiovascular:      Rate and Rhythm: Normal rate and regular rhythm.      Heart sounds: Normal heart sounds.   Pulmonary:      Effort: Pulmonary effort is normal.      Breath sounds: Normal breath sounds.   Abdominal:      General: Bowel sounds are normal.      Palpations: Abdomen is soft.   Genitourinary:     Penis: Normal.        Prostate: Normal.      Rectum: Normal.   Musculoskeletal:         General: Normal range of motion.      Cervical back: Normal range of motion and neck supple.   Skin:     General: Skin is warm and dry.   Neurological:      Mental Status: He is alert and oriented to person, place, and time.      Deep Tendon Reflexes: Reflexes are normal and symmetric.         Assessment & Plan:   1. Burning with urination will send urine for culture and internal Stolzer antibiotic, monitor symptoms drink plenty of fluids   2. Frequency of urination as above will follow results       Orders Placed This Encounter   Procedures    POC Urinalysis, Automated Dip without microscopy (PCA and EMM[facility] ONLY) [13758]    UA/M WITH CULTURE REFLEX [3020]       Meds This Visit:  Requested Prescriptions     Signed Prescriptions Disp Refills    nitrofurantoin monohydrate macro 100 MG Oral Cap 10 capsule 0     Sig: Take 1 capsule (100 mg total) by mouth 2 (two) times daily for 5 days.       Imaging & Referrals:  None         [1]   Current Outpatient Medications   Medication Sig Dispense Refill    nitrofurantoin monohydrate macro 100 MG Oral Cap Take 1 capsule (100 mg total) by mouth 2 (two) times daily for 5 days. 10 capsule 0    dapagliflozin (FARXIGA) 10 MG Oral Tab Take 1 tablet (10 mg total) by mouth daily. 90 tablet 3    Clotrimazole 1 % External Lotion Apply 1 each topically in the morning and 1 each before bedtime. 30 mL 2    SEMGLEE, YFGN, 100 UNIT/ML Subcutaneous Solution Pen-injector INJECT 30 UNITS DAILY INTO THE SKIN 27 mL 1    Insulin Glargine-yfgn (SEMGLEE, YFGN,) 100 UNIT/ML Subcutaneous Solution Pen-injector INJECT 30 UNITS DAILY INTO THE SKIN (Patient taking differently: Inject 30 Units into the skin at bedtime. INJECT 30 UNITS DAILY INTO THE SKIN) 27 mL 1    Insulin Lispro (HUMALOG KWIKPEN) 200 UNIT/ML Subcutaneous Solution Pen-injector Inject 10 Units into the skin in the morning, at noon, and at bedtime. 27 mL 1    hydroCHLOROthiazide  12.5 MG Oral Tab Take 1 tablet (12.5 mg total) by mouth daily. 90 tablet 3    atorvastatin 20 MG Oral Tab Take 1 tablet (20 mg total) by mouth daily.      pantoprazole 40 MG Oral Tab EC Take 1 tablet (40 mg total) by mouth before breakfast. 30 tablet 3    Pediatric Multiple Vit-C-FA (MULTIPLE VITAMINS OR) Take by mouth daily.      Continuous Blood Gluc Sensor (DEXCOM G7 SENSOR) Does not apply Misc 1 each Every 10 days. Use as directed every 10 days 3 each 5    lisinopril 10 MG Oral Tab Take 1 tablet (10 mg total) by mouth daily.      Tirzepatide (MOUNJARO) 7.5 MG/0.5ML Subcutaneous Solution Auto-injector Inject 7.5 mg into the skin every 7 days. (Patient not taking: Reported on 7/1/2025) 6 mL 0    Tirzepatide (MOUNJARO) 5 MG/0.5ML Subcutaneous Solution Auto-injector Inject 5 mg into the skin every 7 days. (Patient not taking: Reported on 7/1/2025) 2 mL 0    Tirzepatide (MOUNJARO) 2.5 MG/0.5ML Subcutaneous Solution Auto-injector Inject 2.5 mg into the skin every 7 days. (Patient not taking: Reported on 7/1/2025) 2 mL 0   [2] No Known Allergies

## 2025-08-07 ENCOUNTER — NURSE TRIAGE (OUTPATIENT)
Dept: INTERNAL MEDICINE CLINIC | Facility: CLINIC | Age: 50
End: 2025-08-07

## 2025-08-12 ENCOUNTER — OFFICE VISIT (OUTPATIENT)
Facility: CLINIC | Age: 50
End: 2025-08-12

## 2025-08-12 ENCOUNTER — TELEPHONE (OUTPATIENT)
Facility: CLINIC | Age: 50
End: 2025-08-12

## 2025-08-12 VITALS
DIASTOLIC BLOOD PRESSURE: 79 MMHG | BODY MASS INDEX: 38.43 KG/M2 | SYSTOLIC BLOOD PRESSURE: 121 MMHG | HEIGHT: 73 IN | HEART RATE: 83 BPM | WEIGHT: 290 LBS

## 2025-08-12 DIAGNOSIS — R14.0 BLOATING: ICD-10-CM

## 2025-08-12 DIAGNOSIS — K62.5 RECTAL BLEEDING: ICD-10-CM

## 2025-08-12 DIAGNOSIS — Z87.19 HISTORY OF HEMORRHOIDS: ICD-10-CM

## 2025-08-12 DIAGNOSIS — K62.5 RECTAL BLEEDING: Primary | ICD-10-CM

## 2025-08-12 DIAGNOSIS — R10.9 ABDOMINAL PAIN, UNSPECIFIED ABDOMINAL LOCATION: ICD-10-CM

## 2025-08-12 DIAGNOSIS — R10.84 GENERALIZED ABDOMINAL PAIN: Primary | ICD-10-CM

## 2025-08-12 PROCEDURE — 99213 OFFICE O/P EST LOW 20 MIN: CPT

## 2025-08-14 ENCOUNTER — NURSE TRIAGE (OUTPATIENT)
Dept: INTERNAL MEDICINE CLINIC | Facility: CLINIC | Age: 50
End: 2025-08-14

## 2025-08-14 DIAGNOSIS — R52 PAIN: Primary | ICD-10-CM

## 2025-08-29 ENCOUNTER — LAB ENCOUNTER (OUTPATIENT)
Dept: LAB | Facility: HOSPITAL | Age: 50
End: 2025-08-29

## 2025-08-29 ENCOUNTER — HOSPITAL ENCOUNTER (OUTPATIENT)
Dept: GENERAL RADIOLOGY | Facility: HOSPITAL | Age: 50
Discharge: HOME | End: 2025-08-29

## 2025-08-29 DIAGNOSIS — E78.5 DYSLIPIDEMIA: ICD-10-CM

## 2025-08-29 DIAGNOSIS — R30.0 BURNING WITH URINATION: ICD-10-CM

## 2025-08-29 DIAGNOSIS — E11.65 HYPERGLYCEMIA DUE TO DIABETES MELLITUS (HCC): ICD-10-CM

## 2025-08-29 DIAGNOSIS — R10.84 GENERALIZED ABDOMINAL PAIN: ICD-10-CM

## 2025-08-29 DIAGNOSIS — I10 HYPERTENSION, UNSPECIFIED TYPE: ICD-10-CM

## 2025-08-29 DIAGNOSIS — R14.0 BLOATING: ICD-10-CM

## 2025-08-29 DIAGNOSIS — N18.9 CHRONIC KIDNEY DISEASE, UNSPECIFIED CKD STAGE: ICD-10-CM

## 2025-08-29 DIAGNOSIS — E11.65 UNCONTROLLED TYPE 2 DIABETES MELLITUS WITH HYPERGLYCEMIA (HCC): ICD-10-CM

## 2025-08-29 DIAGNOSIS — R73.09 HIGH GLUCOSE LEVEL: ICD-10-CM

## 2025-08-29 DIAGNOSIS — I10 PRIMARY HYPERTENSION: ICD-10-CM

## 2025-08-29 LAB
ALBUMIN SERPL-MCNC: 4.8 G/DL (ref 3.2–4.8)
ALBUMIN/GLOB SERPL: 1.7 (ref 1–2)
ALP LIVER SERPL-CCNC: 53 U/L (ref 45–117)
ALT SERPL-CCNC: 41 U/L (ref 10–49)
ANION GAP SERPL CALC-SCNC: 6 MMOL/L (ref 0–18)
AST SERPL-CCNC: 37 U/L (ref ?–34)
BILIRUB SERPL-MCNC: 0.9 MG/DL (ref 0.3–1.2)
BILIRUB UR QL: NEGATIVE
BUN BLD-MCNC: 15 MG/DL (ref 9–23)
BUN/CREAT SERPL: 10.6 (ref 10–20)
CALCIUM BLD-MCNC: 10.1 MG/DL (ref 8.7–10.4)
CHLORIDE SERPL-SCNC: 104 MMOL/L (ref 98–112)
CHOLEST SERPL-MCNC: 120 MG/DL (ref ?–200)
CLARITY UR: CLEAR
CO2 SERPL-SCNC: 30 MMOL/L (ref 21–32)
CREAT BLD-MCNC: 1.42 MG/DL (ref 0.7–1.3)
CREAT UR-SCNC: 115.9 MG/DL
EGFRCR SERPLBLD CKD-EPI 2021: 60 ML/MIN/1.73M2 (ref 60–?)
EST. AVERAGE GLUCOSE BLD GHB EST-MCNC: 166 MG/DL (ref 68–126)
FASTING PATIENT LIPID ANSWER: YES
FASTING STATUS PATIENT QL REPORTED: YES
GLOBULIN PLAS-MCNC: 2.9 G/DL (ref 2–3.5)
GLUCOSE BLD-MCNC: 129 MG/DL (ref 70–99)
GLUCOSE UR-MCNC: >1000 MG/DL
HBA1C MFR BLD: 7.4 % (ref ?–5.7)
HDLC SERPL-MCNC: 32 MG/DL (ref 40–59)
HGB UR QL STRIP.AUTO: NEGATIVE
KETONES UR-MCNC: NEGATIVE MG/DL
LDLC SERPL CALC-MCNC: 70 MG/DL (ref ?–100)
LEUKOCYTE ESTERASE UR QL STRIP.AUTO: NEGATIVE
MICROALBUMIN UR-MCNC: <0.3 MG/DL
NITRITE UR QL STRIP.AUTO: NEGATIVE
NONHDLC SERPL-MCNC: 88 MG/DL (ref ?–130)
OSMOLALITY SERPL CALC.SUM OF ELEC: 293 MOSM/KG (ref 275–295)
PH UR: 5 (ref 5–8)
POTASSIUM SERPL-SCNC: 4.1 MMOL/L (ref 3.5–5.1)
PROT SERPL-MCNC: 7.7 G/DL (ref 5.7–8.2)
PROT UR-MCNC: NEGATIVE MG/DL
SODIUM SERPL-SCNC: 140 MMOL/L (ref 136–145)
SP GR UR STRIP: 1.02 (ref 1–1.03)
TRIGL SERPL-MCNC: 96 MG/DL (ref 30–149)
TSI SER-ACNC: 1.78 UIU/ML (ref 0.55–4.78)
UROBILINOGEN UR STRIP-ACNC: NORMAL
VLDLC SERPL CALC-MCNC: 15 MG/DL (ref 0–30)

## 2025-08-29 PROCEDURE — 81003 URINALYSIS AUTO W/O SCOPE: CPT

## 2025-08-29 PROCEDURE — 83036 HEMOGLOBIN GLYCOSYLATED A1C: CPT

## 2025-08-29 PROCEDURE — 82570 ASSAY OF URINE CREATININE: CPT

## 2025-08-29 PROCEDURE — 74021 RADEX ABDOMEN 3+ VIEWS: CPT

## 2025-08-29 PROCEDURE — 80053 COMPREHEN METABOLIC PANEL: CPT

## 2025-08-29 PROCEDURE — 84443 ASSAY THYROID STIM HORMONE: CPT

## 2025-08-29 PROCEDURE — 82043 UR ALBUMIN QUANTITATIVE: CPT

## 2025-08-29 PROCEDURE — 36415 COLL VENOUS BLD VENIPUNCTURE: CPT

## 2025-08-29 PROCEDURE — 80061 LIPID PANEL: CPT

## (undated) NOTE — LETTER
4/16/2025    Nicolle Bagley  [address] [address]    Dear Nicolle,    Due to a missed appointment on 04/07/2025, your account has been charged $50.    No-show policy    A $50 fee will be charged for missed appointments.    To accommodate all our patients, we require at least 24 hours' notice if you need to cancel or reschedule your appointment.    If three appointments are missed within a 12-month period, we may begin the dismissal process for future care at [facility].    We value the relationship we have established with you.  We hope to continue to serve your health care needs.      Sincerely,    [facility]

## (undated) NOTE — LETTER
01/15/25        Nicolle Bagley  [address]  [place] [address]      Dear Nicolle,    Our records indicate that you have outstanding lab work and or testing that was ordered for you and has not yet been completed:      Hepatitis A B + C profile (Order #[id number]) on 8/1/24       Hepatic Function Panel (7) (Order #[id number]) on 8/1/24       To provide you with the best possible care, please complete these orders at your earliest convenience. If you have recently completed these orders please disregard this letter.     If you have any questions please call the office at No information on file..     Thank you,       Not in an encounter context.

## (undated) NOTE — LETTER
No referring provider defined for this encounter.       02/21/24        Patient: Pamela Bagley   YOB: 1975   Date of Visit: 2/21/2024       Dear  Dr. Nick MD,      Thank you for referring Pamela Bagley to my practice.  Please find my assessment and plan below.      As you know he is a 48-year-old male with a history of hypertension and adult onset diabetes mellitus who I now had the pleasure of seeing for what may now be chronic kidney disease stage II.  Patient just underwent a recent renal evaluation.  Of note is that a sed rate, connective tissue profile, and random urine for Bence-Astorga protein was nonrevealing.  Urinalysis was normal as was his urine microalbumin to creatinine ratio.  Renal ultrasound showed a simple cyst in the right kidney but otherwise was unremarkable.  The liver was compatible with fatty infiltration.  Finally his creatinine done in February 2, 2024 was better down to 1.39 now with an estimated GFR of 63 cc/min.    I therefore informed the patient that his renal function has improved.  Now with chronic kidney disease stage II.  The patient's blood sugars had been out of control with an A1c of 15.3.  This was causing polyuria as well as nocturia leading to some component of volume depletion.  He states he has been working now with endocrine, is trying to watch his diet better and working out at the gym.  With this there has been improvement in his blood sugars but also his renal function.    Encouraged him to continue to work with endocrine to get his A1c is down below 7.  Otherwise he knows to maintain adequate hydration.  Avoid nonsteroidals.  Blood pressures are under good control.  Will have him repeat a CBC and renal panel in 3 months to ensure stability.  Will see again in 6 to 12 months depending upon clinical course.    Thank you again for allowing me to participate in the care of your patient.  If you have any questions please feel free to call.            Sincerely,   Donato Melendez MD   [facility], [facility], [place]  [address]  [place] [id number]    Document electronically generated by:  Donato Melendez MD

## (undated) NOTE — LETTER
No referring provider defined for this encounter.       10/21/24        Patient: Nicolle Bagley   YOB: 1975   Date of Visit: 10/21/2024       Dear  Dr. Nick MD,      Thank you for referring Nicolle Bagley to my practice.  Please find my assessment and plan below.          As you know he is a 49-year-old male with a history of hypertension, hypercholesterolemia and adult onset diabetes mellitus who I now the pleasure of seeing for follow-up of chronic kidney disease stage II.  Overall the patient states has been doing well without any chest pain, shortness of breath, GI or urinary tract symptoms.  Checks blood pressures at home and they have been under good control.  States diabetes is also improved.  Last A1c was 7.1 in July.    On physical exam his blood pressure 110/70 with a pulse of 92 and he weighed 284 pounds.  His neck was supple without JVD.  Lungs are clear.  Heart revealed a regular rate and rhythm with an S4 but no gallops, murmurs or rubs.  Abdomen was soft, flat, nontender without organomegaly, masses or bruits.  Extremities revealed no edema.    The patient just had laboratory studies today.  Creatinine is 1.36 with an estimated GFR of 64 cc/min.  Creatinine was a bit better at 1.22 back in July 2024 but in April 2024 his creatinine was 1.33.    I therefore reassured the patient is overall his renal function is stable.  Blood pressures and blood sugars have improved.  He knows to maintain adequate hydration.  Avoid nonsteroidals.  Repeat CBC, renal panel in 3 months.  Return in 6 months.    Thank you very much for allowing me to participate in the care of your patient.  If you have any questions please were free to call.        Sincerely,   Donato Melendez MD   [facility], [facility], [place]  [address]  [id number]    Document electronically generated by:  Donato Melendez MD

## (undated) NOTE — LETTER
Aureliano Romero Md  [address] [place] [id number] 02/02/24        Patient: Pamela Bagley   YOB: 1975   Date of Visit: 2/2/2024       Dear  Dr. Nick MD,      Thank you for referring Pamela Bagley to my practice.  Please find my assessment and plan below.      As you know he is a 48-year-old male with a history of hypertension and adult onset diabetes mellitus who I now had the pleasure of seeing for evaluation of what may be chronic kidney disease stage III.  Laboratory studies have been reviewed in [facility] and in care everywhere.  Of note is he had a creatinine of 1.4 back in February 2023.  Was 1.20 in September 2023 but in October 2023 his creatinine crept up to 1.6.  Now more recently in January 19, 2000 2040 creatinine 1.63 with an estimated GFR of 52 cc/min.  On that date though his blood sugar was 311 with an A1c of 15.3.  Repeat labs done on January 22, 2024 shows creatinine was a bit better at 1.43.    His past medical history is significant for hypertension diagnosed in 2015.  States he was diagnosed with adult onset diabetes mellitus in 2017.  He was started on medications but subsequently was able to control his diabetes with diet alone.  Did well for 2 or 3 years but then his blood sugars increase and diabetic medications were resumed in 2021.  One of his medications was Januvia.  He had insurance issues and as result cannot afford that medication.  Now has new insurance starting at the beginning of the year and wants to get back on track.  Medications are as listed.  Denies any significant use of nonsteroidals.    Social history quit smoking cigarettes 13 years ago.  Family history is notable for father who apparently had liver failure and subsequent developed chronic kidney disease and recently underwent a liver kidney transplantation.  Doing well.  Family history is also positive for diabetes.  Review of system patient was states he is doing well without any  chest pain, shortness of breath, or GI symptoms.  He has noted urinary frequency with nocturia several times which now is improving as his blood sugars have improved.  10 point review of systems is otherwise unremarkable.    Physical exam initial blood pressure seems a bit low at 98/64 but repeat was 120/75.  Pulse was 93 and he weighed 251 pounds.  His neck was supple without JVD.  Lungs are clear.  Heart revealed a regular rate and rhythm and S4 but no gallops, murmurs or rubs.  Abdomen was soft, flat, nontender without organomegaly, masses or bruits.  Extremities revealed no clubbing, cyanosis or edema.    I therefore informed the patient that he may have chronic kidney disease III secondary to diabetes and/or hypertension.  Some of this may be acute secondary to diabetes out-of-control.  Hopefully will see some improvement in his renal function.  For completion sake a repeat CBC, renal panel, urinalysis, urine for microalbumin, urine for Bence-Astorga protein, sed rate, connective tissue profile and a renal ultrasound have been ordered.  Further impressions and recommendations will be forthcoming after reviewing the above.  He will check his blood pressures daily to confirm that his blood pressures are under adequate control.  Reinforced the importance of getting his A1c down below 7 in order to help prevent diabetic complications.  The patient states he understands and is getting back to exercise and watching his diet.    Thank you very much for allowing me to participate in the care of your patient.  If you have any questions please feel free to call.           Sincerely,   Donato Melendez MD   [facility], [facility], [place]  [address]  [facility] [phone]    Document electronically generated by:  Donato Melendez MD